# Patient Record
Sex: FEMALE | Race: WHITE | NOT HISPANIC OR LATINO | Employment: OTHER | URBAN - METROPOLITAN AREA
[De-identification: names, ages, dates, MRNs, and addresses within clinical notes are randomized per-mention and may not be internally consistent; named-entity substitution may affect disease eponyms.]

---

## 2017-01-01 DIAGNOSIS — E78.5 HYPERLIPIDEMIA: ICD-10-CM

## 2017-01-03 RX ORDER — PROPRANOLOL HYDROCHLORIDE 10 MG/1
TABLET ORAL
Qty: 90 TABLET | Refills: 2 | Status: SHIPPED | OUTPATIENT
Start: 2017-01-03 | End: 2017-10-18

## 2017-01-03 RX ORDER — SIMVASTATIN 20 MG/1
TABLET, FILM COATED ORAL
Qty: 90 TABLET | Refills: 2 | Status: SHIPPED | OUTPATIENT
Start: 2017-01-03 | End: 2017-10-05 | Stop reason: SDUPTHER

## 2017-05-03 RX ORDER — AMLODIPINE BESYLATE 5 MG/1
5 TABLET ORAL DAILY
Qty: 90 TABLET | Refills: 3 | Status: SHIPPED | OUTPATIENT
Start: 2017-05-03 | End: 2018-04-26 | Stop reason: SDUPTHER

## 2017-06-21 ENCOUNTER — OFFICE VISIT (OUTPATIENT)
Dept: INTERNAL MEDICINE | Facility: CLINIC | Age: 82
End: 2017-06-21
Payer: MEDICARE

## 2017-06-21 VITALS
HEIGHT: 60 IN | DIASTOLIC BLOOD PRESSURE: 71 MMHG | WEIGHT: 128.06 LBS | BODY MASS INDEX: 25.14 KG/M2 | OXYGEN SATURATION: 99 % | SYSTOLIC BLOOD PRESSURE: 153 MMHG | HEART RATE: 67 BPM | TEMPERATURE: 99 F | RESPIRATION RATE: 18 BRPM
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
## 2017-06-21 PROCEDURE — 1159F MED LIST DOCD IN RCRD: CPT | Mod: S$GLB,,, | Performed by: NURSE PRACTITIONER

## 2017-06-21 PROCEDURE — 99213 OFFICE O/P EST LOW 20 MIN: CPT | Mod: S$GLB,,, | Performed by: NURSE PRACTITIONER

## 2017-06-21 PROCEDURE — 99999 PR PBB SHADOW E&M-EST. PATIENT-LVL IV: CPT | Mod: PBBFAC,,, | Performed by: NURSE PRACTITIONER

## 2017-06-21 RX ORDER — SERTRALINE HYDROCHLORIDE 25 MG/1
25 TABLET, FILM COATED ORAL DAILY
COMMUNITY
End: 2017-12-13 | Stop reason: SDUPTHER

## 2017-06-21 NOTE — PROGRESS NOTES
Subjective:      Patient ID: Susy Mullen is a 82 y.o. female.    Chief Complaint: Animal Bite    HPI:  Patient states last Friday, 5 days ago, her 20 yr old house cat bit the back of her right leg.  She cleaned it up, it bled some.  She says she wants it evaluated, says it is looking better every day.  Denies any pain, fever, or drainage.  Has been cleaning it and using triple antibiotic cream.  She thinks she had a tetanus shot about 4 yrs ago from a different cat bit that was infected.      Past Medical History:   Diagnosis Date    AAA (abdominal aortic aneurysm)     seen by Dr. De Paz, CVT for evaluation, monitoring every 6 months    Anemia     Aortic valve insufficiency     followed by Dr. Urena, Cardiology; non-rheumatic    Carotid artery disease 12/30/2013    Cataract     Chronic kidney disease     Essential hypertension, benign 12/30/2013    GERD (gastroesophageal reflux disease)     Glaucoma     followed by Dr. Yip and Dr. Hdez    History of pneumonia 1968    bilateral    Hyperlipidemia     hypercholesterolemia    Osteoporosis     Pneumonia 1968    did not require hospitalization    Pure hypercholesterolemia 12/30/2013    PVD (peripheral vascular disease)     followed by Dr. Urena, Cardiology    Retinal tear     followed by Dr. Dickson       Past Surgical History:   Procedure Laterality Date    APPENDECTOMY  1978    incidental    arm surgery Left     for infected cat bite    CATARACT EXTRACTION Bilateral     then lens replacement bilateral     EYE SURGERY  2012    right eye glaucoma surgery    HYSTERECTOMY  1978    NEREYDA/BSO       Lab Results   Component Value Date    WBC 5.76 12/14/2016    HGB 12.8 12/14/2016    HCT 38.9 12/14/2016     12/14/2016    CHOL 186 12/14/2016    TRIG 93 12/14/2016    HDL 71 12/14/2016    ALT 10 12/14/2016    AST 20 12/14/2016     12/14/2016    K 4.5 12/14/2016     12/14/2016    CREATININE 0.8 12/14/2016    BUN 21  "12/14/2016    CO2 24 12/14/2016    TSH 2.656 12/14/2016    GLUF 105 11/22/2011    HGBA1C 5.8 11/22/2011       BP (!) 153/71   Pulse 67   Temp 98.8 °F (37.1 °C) (Tympanic)   Resp 18   Ht 4' 11.5" (1.511 m)   Wt 58.1 kg (128 lb 1.4 oz)   SpO2 99%   BMI 25.44 kg/m²       Review of Systems   Constitutional: Negative for appetite change, fatigue and unexpected weight change.   HENT: Negative for congestion, ear pain, postnasal drip, rhinorrhea, sinus pressure, sneezing, sore throat, tinnitus, trouble swallowing and voice change.    Eyes: Negative for pain, discharge, redness, itching and visual disturbance.   Respiratory: Negative for cough, chest tightness, shortness of breath and wheezing.    Cardiovascular: Negative for chest pain, palpitations and leg swelling.   Gastrointestinal: Negative for abdominal distention, abdominal pain, blood in stool, constipation, diarrhea, nausea and vomiting.        No reflux.   Genitourinary: Negative for difficulty urinating, dyspareunia, flank pain, menstrual problem and pelvic pain.   Musculoskeletal: Negative for arthralgias, back pain, myalgias and neck stiffness.   Skin: Negative for color change and rash.        Cat bite   Neurological: Negative for dizziness and headaches.   Psychiatric/Behavioral: Negative for confusion and sleep disturbance. The patient is not nervous/anxious.       Objective:     Physical Exam   Constitutional: She is oriented to person, place, and time. She appears well-developed and well-nourished. No distress.   Musculoskeletal:   Normal gait   Neurological: She is alert and oriented to person, place, and time.   Skin: Skin is warm and dry.   Right posterior lower leg in calf muscle is a small puncture site, there is no redness, no warmth, no swelling.  Just bruised around it a little.  No signs of infection, not near a bone   Psychiatric: She has a normal mood and affect. Her behavior is normal.     Assessment:      1. Cat bite, initial encounter "      Plan:   Cat bite, initial encounter      She really doesn't want antibiotics, we agreed to a watchful wait approach.  We discussed what were signs of infection and when to let me know of any changes.  She is going to check on her last tetanus shot, if > 5 yrs will RTC for a booster    Current Outpatient Prescriptions:     acetaZOLAMIDE (DIAMOX) 250 MG tablet, Take by mouth. 1 Tablet Oral Twice a day, Disp: , Rfl:     amlodipine (NORVASC) 5 MG tablet, Take 1 tablet (5 mg total) by mouth once daily., Disp: 90 tablet, Rfl: 3    ascorbic acid (VITAMIN C) 1000 MG tablet, Take 1,000 mg by mouth once daily., Disp: , Rfl:     aspirin (ECOTRIN) 81 MG EC tablet, Take by mouth. 1 Tablet, Delayed Release (E.C.) Oral Every day, Disp: , Rfl:     bimatoprost (LUMIGAN) 0.01 % Drop, 1 drop every evening., Disp: , Rfl:     CALCIUM CARBONATE/VITAMIN D3 (CALCIUM 500 + D ORAL), 0 tablets., Disp: , Rfl:     dexamethasone (DECADRON) 0.1 % ophthalmic solution, , Disp: , Rfl:     dorzolamide-timolol 2-0.5% (COSOPT) 2-0.5 % ophthalmic solution, , Disp: , Rfl:     omeprazole (PRILOSEC) 20 MG capsule, TAKE ONE CAPSULE BY MOUTH DAILY, Disp: 90 capsule, Rfl: 2    propranolol (INDERAL) 10 MG tablet, Take 1 tablet (10 mg total) by mouth once daily., Disp: 90 tablet, Rfl: 3    propranolol (INDERAL) 10 MG tablet, TAKE ONE TABLET BY MOUTH DAILY, Disp: 90 tablet, Rfl: 2    psyllium (METAMUCIL) packet, Take 1 packet by mouth once daily., Disp: , Rfl:     sertraline (ZOLOFT) 25 MG tablet, Take 25 mg by mouth once daily., Disp: , Rfl:     simvastatin (ZOCOR) 20 MG tablet, TAKE ONE TABLET BY MOUTH EVERY EVENING., Disp: 90 tablet, Rfl: 2    vitamin E 400 unit Tab, , Disp: , Rfl:

## 2017-06-26 ENCOUNTER — TELEPHONE (OUTPATIENT)
Dept: INTERNAL MEDICINE | Facility: CLINIC | Age: 82
End: 2017-06-26

## 2017-06-26 NOTE — TELEPHONE ENCOUNTER
----- Message from Nikky Rdz sent at 6/26/2017  4:17 PM CDT -----  Contact: pt  States she was bitten by her cat and she would like to get an antibiotic called in. Pt uses     [id number] - [place], [place] - [address] [facility]  [address] [address] [place] [facility] [address]  Phone: [phone] Fax: [phone]    Please call pt at [phone] or [phone]. Thank you

## 2017-06-26 NOTE — TELEPHONE ENCOUNTER
"S/w pt. Stated, " My cat bite is getting better but my daughter thinks I should have the Antibiotic. I also had my last tetanus at your office". I reviewed immunization record and most recent was TD 09/21/06. LV  06/21/17. Please advise. I apologized for the delay from our office/TGD  "

## 2017-06-27 ENCOUNTER — CLINICAL SUPPORT (OUTPATIENT)
Dept: INTERNAL MEDICINE | Facility: CLINIC | Age: 82
End: 2017-06-27
Payer: MEDICARE

## 2017-06-27 VITALS
TEMPERATURE: 99 F | WEIGHT: 128.5 LBS | BODY MASS INDEX: 25.91 KG/M2 | DIASTOLIC BLOOD PRESSURE: 63 MMHG | HEART RATE: 66 BPM | OXYGEN SATURATION: 97 % | HEIGHT: 59 IN | SYSTOLIC BLOOD PRESSURE: 142 MMHG

## 2017-06-27 DIAGNOSIS — Z23 NEED FOR VACCINATION: Primary | ICD-10-CM

## 2017-06-27 PROCEDURE — 90715 TDAP VACCINE 7 YRS/> IM: CPT | Mod: S$GLB,,, | Performed by: INTERNAL MEDICINE

## 2017-06-27 PROCEDURE — 99999 PR PBB SHADOW E&M-EST. PATIENT-LVL III: CPT | Mod: PBBFAC,,,

## 2017-06-27 PROCEDURE — 90471 IMMUNIZATION ADMIN: CPT | Mod: S$GLB,,, | Performed by: INTERNAL MEDICINE

## 2017-06-27 NOTE — TELEPHONE ENCOUNTER
Please let her know she is due to take the tetanus again.  She can come today, I can look at her leg again at the nurse visit.   Ill order the tetanus

## 2017-06-27 NOTE — PROGRESS NOTES
"Subjective:      Patient ID: Susy Mullen is a 82 y.o. female.    Chief Complaint: No chief complaint on file.    HPI:     Past Medical History:   Diagnosis Date    AAA (abdominal aortic aneurysm)     seen by Dr. De Paz, CVT for evaluation, monitoring every 6 months    Anemia     Aortic valve insufficiency     followed by Dr. Urena, Cardiology; non-rheumatic    Carotid artery disease 12/30/2013    Cataract     Chronic kidney disease     Essential hypertension, benign 12/30/2013    GERD (gastroesophageal reflux disease)     Glaucoma     followed by Dr. Yip and Dr. Hdez    History of pneumonia 1968    bilateral    Hyperlipidemia     hypercholesterolemia    Osteoporosis     Pneumonia 1968    did not require hospitalization    Pure hypercholesterolemia 12/30/2013    PVD (peripheral vascular disease)     followed by Dr. Urena, Cardiology    Retinal tear     followed by Dr. Dickson       Past Surgical History:   Procedure Laterality Date    APPENDECTOMY  1978    incidental    arm surgery Left     for infected cat bite    CATARACT EXTRACTION Bilateral     then lens replacement bilateral     EYE SURGERY  2012    right eye glaucoma surgery    HYSTERECTOMY  1978    NEREYDA/BSO       Lab Results   Component Value Date    WBC 5.76 12/14/2016    HGB 12.8 12/14/2016    HCT 38.9 12/14/2016     12/14/2016    CHOL 186 12/14/2016    TRIG 93 12/14/2016    HDL 71 12/14/2016    ALT 10 12/14/2016    AST 20 12/14/2016     12/14/2016    K 4.5 12/14/2016     12/14/2016    CREATININE 0.8 12/14/2016    BUN 21 12/14/2016    CO2 24 12/14/2016    TSH 2.656 12/14/2016    GLUF 105 11/22/2011    HGBA1C 5.8 11/22/2011       BP (!) 142/63   Pulse 66   Temp 98.9 °F (37.2 °C) (Tympanic)   Ht 4' 11" (1.499 m)   Wt 58.3 kg (128 lb 8.5 oz)   SpO2 97%   BMI 25.96 kg/m²       Review of Systems   Objective:     Physical Exam  Assessment:      1. Need for vaccination      Plan:   Need for " vaccination          Current Outpatient Prescriptions:     acetaZOLAMIDE (DIAMOX) 250 MG tablet, Take by mouth. 1 Tablet Oral Twice a day, Disp: , Rfl:     amlodipine (NORVASC) 5 MG tablet, Take 1 tablet (5 mg total) by mouth once daily., Disp: 90 tablet, Rfl: 3    ascorbic acid (VITAMIN C) 1000 MG tablet, Take 1,000 mg by mouth once daily., Disp: , Rfl:     aspirin (ECOTRIN) 81 MG EC tablet, Take by mouth. 1 Tablet, Delayed Release (E.C.) Oral Every day, Disp: , Rfl:     bimatoprost (LUMIGAN) 0.01 % Drop, 1 drop every evening., Disp: , Rfl:     CALCIUM CARBONATE/VITAMIN D3 (CALCIUM 500 + D ORAL), 0 tablets., Disp: , Rfl:     dexamethasone (DECADRON) 0.1 % ophthalmic solution, , Disp: , Rfl:     dorzolamide-timolol 2-0.5% (COSOPT) 2-0.5 % ophthalmic solution, , Disp: , Rfl:     omeprazole (PRILOSEC) 20 MG capsule, TAKE ONE CAPSULE BY MOUTH DAILY, Disp: 90 capsule, Rfl: 2    propranolol (INDERAL) 10 MG tablet, Take 1 tablet (10 mg total) by mouth once daily., Disp: 90 tablet, Rfl: 3    propranolol (INDERAL) 10 MG tablet, TAKE ONE TABLET BY MOUTH DAILY, Disp: 90 tablet, Rfl: 2    psyllium (METAMUCIL) packet, Take 1 packet by mouth once daily., Disp: , Rfl:     sertraline (ZOLOFT) 25 MG tablet, Take 25 mg by mouth once daily., Disp: , Rfl:     simvastatin (ZOCOR) 20 MG tablet, TAKE ONE TABLET BY MOUTH EVERY EVENING., Disp: 90 tablet, Rfl: 2    vitamin E 400 unit Tab, , Disp: , Rfl:

## 2017-06-28 NOTE — TELEPHONE ENCOUNTER
----- Message from Tamie Palafox sent at 6/28/2017 12:50 PM CDT -----  Contact: pt  Pt states she is retuning a missed call, pt can be reached at [phone]//thxMW

## 2017-07-05 ENCOUNTER — TELEPHONE (OUTPATIENT)
Dept: INTERNAL MEDICINE | Facility: CLINIC | Age: 82
End: 2017-07-05

## 2017-07-05 DIAGNOSIS — Z12.31 ENCOUNTER FOR SCREENING MAMMOGRAM FOR BREAST CANCER: Primary | ICD-10-CM

## 2017-07-05 NOTE — TELEPHONE ENCOUNTER
----- Message from Joycelyn Siddiqui sent at 7/5/2017  3:48 PM CDT -----  Contact: [phone] or [phone]  Patient would like an order put in for a mammogram. Please call back at [phone] or [phone].      Thanks,  Joycelyn Siddiqui

## 2017-07-05 NOTE — TELEPHONE ENCOUNTER
----- Message from Asmita Thao sent at 7/5/2017  9:02 AM CDT -----  Contact: patient  Calling to get mammogram order and to be seen on [place] and this Friday. Please call patient @ [phone] Thanks, Sandy

## 2017-07-31 ENCOUNTER — HOSPITAL ENCOUNTER (OUTPATIENT)
Dept: RADIOLOGY | Facility: HOSPITAL | Age: 82
Discharge: HOME | End: 2017-07-31
Attending: FAMILY MEDICINE
Payer: MEDICARE

## 2017-07-31 DIAGNOSIS — Z12.31 ENCOUNTER FOR SCREENING MAMMOGRAM FOR BREAST CANCER: ICD-10-CM

## 2017-07-31 PROCEDURE — 77063 BREAST TOMOSYNTHESIS BI: CPT | Mod: 26,,, | Performed by: RADIOLOGY

## 2017-07-31 PROCEDURE — 77067 SCR MAMMO BI INCL CAD: CPT | Mod: 26,,, | Performed by: RADIOLOGY

## 2017-07-31 PROCEDURE — 77067 SCR MAMMO BI INCL CAD: CPT | Mod: TC

## 2017-08-30 ENCOUNTER — PATIENT OUTREACH (OUTPATIENT)
Dept: ADMINISTRATIVE | Facility: HOSPITAL | Age: 82
End: 2017-08-30

## 2017-08-30 NOTE — PROGRESS NOTES
Schedule patient for blood pressure recheck, tetanus and zoster vaccine on 10/18/17. Patient in agreement and vocalize understanding. A appointment reminder will be sent in the mail.    Dexa Scan- last 01/26/15

## 2017-09-08 ENCOUNTER — PATIENT OUTREACH (OUTPATIENT)
Dept: ADMINISTRATIVE | Facility: HOSPITAL | Age: 82
End: 2017-09-08

## 2017-09-08 DIAGNOSIS — M81.0 OSTEOPOROSIS, UNSPECIFIED OSTEOPOROSIS TYPE, UNSPECIFIED PATHOLOGICAL FRACTURE PRESENCE: Primary | ICD-10-CM

## 2017-09-11 ENCOUNTER — PATIENT OUTREACH (OUTPATIENT)
Dept: ADMINISTRATIVE | Facility: HOSPITAL | Age: 82
End: 2017-09-11

## 2017-09-11 NOTE — PROGRESS NOTES
Schedule dexa scan on the same day as other appointments 10/02/17 at 09:30. Patient in agreement and vocalize understanding. Appointment reminder sent in the mail.

## 2017-09-15 DIAGNOSIS — K21.9 GASTROESOPHAGEAL REFLUX DISEASE, ESOPHAGITIS PRESENCE NOT SPECIFIED: ICD-10-CM

## 2017-09-15 RX ORDER — OMEPRAZOLE 20 MG/1
CAPSULE, DELAYED RELEASE ORAL
Qty: 90 CAPSULE | Refills: 1 | Status: SHIPPED | OUTPATIENT
Start: 2017-09-15 | End: 2017-10-18 | Stop reason: ALTCHOICE

## 2017-09-29 RX ORDER — PROPRANOLOL HYDROCHLORIDE 10 MG/1
TABLET ORAL
Qty: 90 TABLET | Refills: 1 | Status: SHIPPED | OUTPATIENT
Start: 2017-09-29 | End: 2017-10-18 | Stop reason: SDUPTHER

## 2017-10-05 DIAGNOSIS — E78.5 HYPERLIPIDEMIA: ICD-10-CM

## 2017-10-06 RX ORDER — SIMVASTATIN 20 MG/1
TABLET, FILM COATED ORAL
Qty: 90 TABLET | Refills: 0 | Status: SHIPPED | OUTPATIENT
Start: 2017-10-06 | End: 2018-01-03 | Stop reason: SDUPTHER

## 2017-10-16 PROBLEM — I70.0 ATHEROSCLEROSIS OF AORTA: Status: ACTIVE | Noted: 2017-10-16

## 2017-10-17 ENCOUNTER — TELEPHONE (OUTPATIENT)
Dept: RADIOLOGY | Facility: HOSPITAL | Age: 82
End: 2017-10-17

## 2017-10-18 ENCOUNTER — OFFICE VISIT (OUTPATIENT)
Dept: INTERNAL MEDICINE | Facility: CLINIC | Age: 82
End: 2017-10-18
Payer: MEDICARE

## 2017-10-18 ENCOUNTER — APPOINTMENT (OUTPATIENT)
Dept: RADIOLOGY | Facility: CLINIC | Age: 82
End: 2017-10-18
Attending: FAMILY MEDICINE
Payer: MEDICARE

## 2017-10-18 VITALS
SYSTOLIC BLOOD PRESSURE: 130 MMHG | BODY MASS INDEX: 25.34 KG/M2 | WEIGHT: 125.69 LBS | HEIGHT: 59 IN | OXYGEN SATURATION: 99 % | TEMPERATURE: 97 F | HEART RATE: 68 BPM | DIASTOLIC BLOOD PRESSURE: 70 MMHG

## 2017-10-18 VITALS
HEIGHT: 59 IN | SYSTOLIC BLOOD PRESSURE: 108 MMHG | DIASTOLIC BLOOD PRESSURE: 60 MMHG | BODY MASS INDEX: 25.25 KG/M2 | WEIGHT: 125.25 LBS | OXYGEN SATURATION: 98 % | HEART RATE: 60 BPM

## 2017-10-18 DIAGNOSIS — I71.40 ABDOMINAL AORTIC ANEURYSM (AAA) WITHOUT RUPTURE: Chronic | ICD-10-CM

## 2017-10-18 DIAGNOSIS — K21.9 GASTROESOPHAGEAL REFLUX DISEASE, ESOPHAGITIS PRESENCE NOT SPECIFIED: ICD-10-CM

## 2017-10-18 DIAGNOSIS — R73.01 ELEVATED FASTING GLUCOSE: ICD-10-CM

## 2017-10-18 DIAGNOSIS — M81.0 OSTEOPOROSIS, UNSPECIFIED OSTEOPOROSIS TYPE, UNSPECIFIED PATHOLOGICAL FRACTURE PRESENCE: ICD-10-CM

## 2017-10-18 DIAGNOSIS — I70.0 ATHEROSCLEROSIS OF AORTA: ICD-10-CM

## 2017-10-18 DIAGNOSIS — I77.9 CAROTID ARTERY DISEASE, UNSPECIFIED LATERALITY: ICD-10-CM

## 2017-10-18 DIAGNOSIS — H91.90 HEARING DIFFICULTY, UNSPECIFIED LATERALITY: ICD-10-CM

## 2017-10-18 DIAGNOSIS — I71.40 ABDOMINAL AORTIC ANEURYSM (AAA) WITHOUT RUPTURE: Primary | ICD-10-CM

## 2017-10-18 DIAGNOSIS — I10 ESSENTIAL HYPERTENSION: Chronic | ICD-10-CM

## 2017-10-18 DIAGNOSIS — Z00.00 ENCOUNTER FOR PREVENTIVE HEALTH EXAMINATION: Primary | ICD-10-CM

## 2017-10-18 DIAGNOSIS — H40.9 GLAUCOMA, UNSPECIFIED GLAUCOMA TYPE, UNSPECIFIED LATERALITY: Chronic | ICD-10-CM

## 2017-10-18 DIAGNOSIS — E78.5 HYPERLIPIDEMIA, UNSPECIFIED HYPERLIPIDEMIA TYPE: Chronic | ICD-10-CM

## 2017-10-18 DIAGNOSIS — H40.9 GLAUCOMA, UNSPECIFIED GLAUCOMA TYPE, UNSPECIFIED LATERALITY: ICD-10-CM

## 2017-10-18 DIAGNOSIS — E78.5 HYPERLIPIDEMIA, UNSPECIFIED HYPERLIPIDEMIA TYPE: ICD-10-CM

## 2017-10-18 DIAGNOSIS — I10 ESSENTIAL HYPERTENSION: ICD-10-CM

## 2017-10-18 PROCEDURE — 99999 PR PBB SHADOW E&M-EST. PATIENT-LVL IV: CPT | Mod: PBBFAC,,, | Performed by: FAMILY MEDICINE

## 2017-10-18 PROCEDURE — 99499 UNLISTED E&M SERVICE: CPT | Mod: S$GLB,,, | Performed by: NURSE PRACTITIONER

## 2017-10-18 PROCEDURE — 77080 DXA BONE DENSITY AXIAL: CPT | Mod: TC

## 2017-10-18 PROCEDURE — 99999 PR PBB SHADOW E&M-EST. PATIENT-LVL IV: CPT | Mod: PBBFAC,,, | Performed by: NURSE PRACTITIONER

## 2017-10-18 PROCEDURE — G0439 PPPS, SUBSEQ VISIT: HCPCS | Mod: S$GLB,,, | Performed by: NURSE PRACTITIONER

## 2017-10-18 PROCEDURE — 99214 OFFICE O/P EST MOD 30 MIN: CPT | Mod: S$GLB,,, | Performed by: FAMILY MEDICINE

## 2017-10-18 PROCEDURE — 77080 DXA BONE DENSITY AXIAL: CPT | Mod: 26,,, | Performed by: RADIOLOGY

## 2017-10-18 PROCEDURE — 99499 UNLISTED E&M SERVICE: CPT | Mod: S$GLB,,, | Performed by: FAMILY MEDICINE

## 2017-10-18 RX ORDER — PANTOPRAZOLE SODIUM 40 MG/1
40 TABLET, DELAYED RELEASE ORAL DAILY
Qty: 30 TABLET | Refills: 5 | Status: SHIPPED | OUTPATIENT
Start: 2017-10-18 | End: 2018-06-15 | Stop reason: SDUPTHER

## 2017-10-18 NOTE — Clinical Note
Your patient was seen today for a HRA visit. Abnormalities have been identified during this visit that may require additional testing and  follow up. I have included a copy of my visit note, please review the note and feel free to contact me with any questions.  Thank you for allowing me to participate in the care of your patients.  Ludmila Solis NP

## 2017-10-18 NOTE — PATIENT INSTRUCTIONS
Counseling and Referral of Other Preventative  (Italic type indicates deductible and co-insurance are waived)    Patient Name: Susy Mullen  Today's Date: 10/18/2017      SERVICE LIMITATIONS RECOMMENDATION    Vaccines    · Pneumococcal (once after 65)    · Influenza (annually)    · Hepatitis B (if medium/high risk)    · Prevnar 13      Hepatitis B medium/high risk factors:       - End-stage renal disease       - Hemophiliacs who received Factor VII or         IX concentrates       - Clients of institutions for the mentally             retarded       - Persons who live in the same house as          a HepB carrier       - Homosexual men       - Illicit injectable drug abusers     Pneumococcal: Done, 10/30/2001 according to health maintenance.      Influenza: Recommended; she is seeing PCP following HRA visit     Hepatitis B: N/A     Prevnar 13: Done, 12/14/2015    Mammogram (biennial age 50-74)  Annually (age 40 or over) Mammogram 7/31/2017, report recommended 1 year repeat.     Pap (up to age 70 and after 70 if unknown history or abnormal study last 10 years)   Denies a history of abnormal pap smear.   Per patient s/p total hysterectomy for benign reasons.     Colorectal cancer screening (to age 75)    · Fecal occult blood test (annual)  · Flexible sigmoidoscopy (5y)  · Screening colonoscopy (10y)  · Barium enema   Family history of colon cancer (brother).   Denies a history of colon polyps.   She is outside of recommended age for screening.   With family history, advised to discuss colon cancer screening with PCP.     Diabetes self-management training (no USPSTF recommendations)  Requires referral by treating physician for patient with diabetes or renal disease. 10 hours of initial DSMT sessions of no less than 30 minutes each in a continuous 12-month period. 2 hours of follow-up DSMT in subsequent years.  Patient is not a diabetic and does not have any known renal disease     Bone mass measurements (age 65 &  older, biennial)  Requires diagnosis related to osteoporosis or estrogen deficiency. Biennial benefit unless patient has history of long-term glucocorticoid  She had DEXA today. Advised to follow up with PCP for results and recommendations.     Glaucoma screening (no USPSTF recommendation)  Diabetes mellitus, family history   , age 50 or over    American, age 65 or over Encouraged yearly eye exams.    She reports she goes every 2-3 months.    Medical nutrition therapy for diabetes or renal disease (no recommended schedule)  Requires referral by treating physician for patient with diabetes or renal disease or kidney transplant within the past 3 years.  Can be provided in same year as diabetes self-management training (DSMT), and CMS recommends medical nutrition therapy take place after DSMT. Up to 3 hours for initial year and 2 hours in subsequent years. Patient is not a diabetic and does not have any known renal disease     Cardiovascular screening blood tests (every 5 years)  · Fasting lipid panel  Order as a panel if possible  Lipid 12/14/2016, repeat 1 year    Diabetes screening tests (at least every 3 years, Medicare covers annually or at 6-month intervals for prediabetic patients)  · Fasting blood sugar (FBS) or glucose tolerance test (GTT)  Patient must be diagnosed with one of the following:       - Hypertension       - Dyslipidemia       - Obesity (BMI 30kg/m2)       - Previous elevated impaired FBS or GTT       ... or any two of the following:       - Overweight (BMI 25 but <30)       - Family history of diabetes       - Age 65 or older       - History of gestational diabetes or birth of baby weighing more than 9 pounds CMP 12/14/2016 with elevated glucose. Advised to follow up with PCP.     HIV screening (annually for increased risk patients)  · HIV-1 and HIV-2 by EIA, or SASHA, rapid antibody test or oral mucosa transudate  Patients must be at increased risk for HIV infection per  USPSTF guidelines or pregnant. Tests covered annually for patient at increased risk or as requested by the patient. Pregnant patients may receive up to 3 tests during pregnancy.  Risks discussed, screening is not recommended    Smoking cessation counseling (up to 8 sessions per year)  Patients must be asymptomatic of tobacco-related conditions to receive as a preventative service.  Non-smoker    Subsequent annual wellness visit  At least 12 months since last AWV  Return in one year     The following information is provided to all patients.  This information is to help you find resources for any of the problems found today that may be affecting your health:                Living healthy guide: www.[facility].[place].[facility]      Understanding Diabetes: www.diabetes.org      Eating healthy: www.cdc.gov/healthyweight      CDC home safety checklist: www.cdc.gov/steadi/patient.html      Agency on Aging: www.goea.[place].[facility]      Alcoholics anonymous (AA): www.aa.org      Physical Activity: www.kiara.nih.gov/qt7nczt      Tobacco use: www.quitwithusla.org

## 2017-10-18 NOTE — PROGRESS NOTES
"Susy Mullen presented for a  Medicare AWV and comprehensive Health Risk Assessment today. The following components were reviewed and updated:    · Medical history  · Family History  · Social history  · Allergies and Current Medications  · Health Risk Assessment  · Health Maintenance  · Care Team     ** See Completed Assessments for Annual Wellness Visit within the encounter summary.**       The following assessments were completed:  · Living Situation  · CAGE  · Depression Screening  · Timed Get Up and Go  · Whisper Test  · Cognitive Function Screening  · Nutrition Screening  · ADL Screening  · PAQ Screening    Vitals:    10/18/17 0946   BP: 108/60   BP Location: Left arm   Patient Position: Sitting   BP Method: Medium (Manual)   Pulse: 60   SpO2: 98%   Weight: 56.8 kg (125 lb 3.5 oz)   Height: 4' 11" (1.499 m)     Body mass index is 25.29 kg/m².  Physical Exam   Constitutional: She is oriented to person, place, and time. She appears well-developed and well-nourished.   HENT:   Head: Normocephalic.   Cardiovascular: Normal rate, regular rhythm and normal heart sounds.    No murmur heard.  Pulmonary/Chest: Effort normal and breath sounds normal. No respiratory distress.   Abdominal: Soft. She exhibits no mass. There is no tenderness.   Musculoskeletal: Normal range of motion. She exhibits no edema.   Neurological: She is alert and oriented to person, place, and time. She exhibits normal muscle tone.   Skin: Skin is warm, dry and intact.   Psychiatric: She has a normal mood and affect. Her speech is normal and behavior is normal.   Nursing note and vitals reviewed.        Diagnoses and health risks identified today and associated recommendations/orders:    1. Encounter for preventive health examination    2. Abdominal aortic aneurysm (AAA) without rupture  US AA 1/11/2016---1.  Mild interval increase in the overall size of the known fusiform aneurysmal dilatation distal abdominal aorta.  See above.  Sees outside " vascular surgeon, Dr. De Paz    Reports monitored per Dr. De Paz every 6 months. Per patient last check AAA was stable. Reports she has an appointment with Dr. De Paz at the end of the month.   Stable. Continue current treatment plan as previously prescribed with your PCP and outside vascular surgeon, Dr. De Paz.     3. Carotid artery disease, unspecified laterality  US Carotid 8/31/2010 (in [place])  Per patient her outside cardiologist, Dr. Urena recommended to have her outside vascular surgeon recheck carotids at upcoming visit.   Advised to follow up with outside vascular surgeon for further evaluation and recommendations. She expressed understanding.      4. Essential hypertension  Stable and controlled. Continue current treatment plan as previously prescribed with your PCP.     5. Hyperlipidemia, unspecified hyperlipidemia type  Stable and controlled. Continue current treatment plan as previously prescribed with your PCP.     6. Elevated fasting glucose  Discussed with patient.   Advised to follow up with PCP for further evaluation and recommendations. She expressed understanding.      7. Atherosclerosis of aorta  CXR 3/7/2016 (care everywhere)---There is atherosclerotic calcification of the aorta  Discussed diagnosis and risk reduction with patient. She expressed understanding.     Advised to follow up with PCP and outside cardiologist for further recommendations. She expressed understanding.      8. Gastroesophageal reflux disease, esophagitis presence not specified  Not controlled on Prilosec. Reports she wakes up at night with a cough and can taste the acid in the back of her throat.   Patient counseled on dietary changes and tips to control acid.  She expressed understanding.     Advised to follow up with PCP for further evaluation and treatment. She expressed understanding.      9. Glaucoma, unspecified glaucoma type, unspecified laterality  Sees outside eye doctors, Dr. Yip and   [place]  Continue current treatment plan as previously prescribed with your outside eye doctors.      10. Hearing difficulty, unspecified laterality  Reports difficulty hearing and ringing in ears.   Abnormal whisper test-left ear abnormal: right ear normal.   Advised to follow up with PCP for further evaluation and recommendations. She expressed understanding.      11. Osteoporosis, unspecified osteoporosis type, unspecified pathological fracture presence  DEXA 1/26/2015  She had DEXA this morning.   Advised to follow up with PCP for results and recommendations. She expressed understanding.       Provided Susy with a 5-10 year written screening schedule and personal prevention plan. Recommendations were developed using the USPSTF age appropriate recommendations. Education, counseling, and referrals were provided as needed. After Visit Summary printed and given to patient which includes a list of additional screenings\tests needed.    Return in about 1 year (around 10/18/2018) for HRA.    Ludmila Solis NP

## 2017-10-18 NOTE — ASSESSMENT & PLAN NOTE
Not well controlled on prilosec, will change to protonix, patient will notify me if not improved/controlled

## 2017-10-18 NOTE — PROGRESS NOTES
Subjective:       Patient ID: Susy Mullen is a 82 y.o. female.    Chief Complaint: Follow-up (HTN, bp check.)    Patient presents to clinic today for followup of hypertension and review HRA.        Review of Systems   Constitutional: Negative for chills, fatigue, fever and unexpected weight change.   Eyes: Negative for visual disturbance.   Respiratory: Negative for shortness of breath.    Cardiovascular: Negative for chest pain.   Musculoskeletal: Negative for myalgias.   Neurological: Negative for headaches.       Objective:      Physical Exam   Constitutional: She is oriented to person, place, and time. She appears well-developed and well-nourished. No distress.   HENT:   Head: Normocephalic and atraumatic.   Eyes: Conjunctivae and EOM are normal. Pupils are equal, round, and reactive to light. No scleral icterus.   Cardiovascular: Normal rate and regular rhythm.  Exam reveals no gallop and no friction rub.    No murmur heard.  Pulmonary/Chest: Effort normal and breath sounds normal.   Neurological: She is alert and oriented to person, place, and time. No cranial nerve deficit. Gait normal.   Psychiatric: She has a normal mood and affect.   Vitals reviewed.      Assessment:       1. Abdominal aortic aneurysm (AAA) without rupture    2. Carotid artery disease, unspecified laterality    3. Essential hypertension    4. Hyperlipidemia, unspecified hyperlipidemia type    5. Elevated fasting glucose    6. Atherosclerosis of aorta    7. Gastroesophageal reflux disease, esophagitis presence not specified    8. Glaucoma, unspecified glaucoma type, unspecified laterality    9. Hearing difficulty, unspecified laterality    10. Osteoporosis, unspecified osteoporosis type, unspecified pathological fracture presence        Plan:     Problem List Items Addressed This Visit     Glaucoma (Chronic)    Overview     Sees outside eye doctors, Dr. Yip and Dr. Dcikson         Hyperlipidemia (Chronic)    Current Assessment &  Plan     Status pending labs, continue zocor           Essential hypertension (Chronic)    Current Assessment & Plan     Controlled, continue propranolol and amlodipine         Abdominal aortic aneurysm (AAA) without rupture - Primary (Chronic)    Overview     US AA 1/11/2016---1.  Mild interval increase in the overall size of the known fusiform aneurysmal dilatation distal abdominal aorta.  See above.  Sees outside vascular surgeon, Dr. De Paz         Osteoporosis    Current Assessment & Plan     dexa done this morning, result pending         GERD (gastroesophageal reflux disease)    Current Assessment & Plan     Not well controlled on prilosec, will change to protonix, patient will notify me if not improved/controlled         Relevant Medications    pantoprazole (PROTONIX) 40 MG tablet    Atherosclerosis of aorta    Overview     CXR 3/7/2016 (care everywhere)---There is atherosclerotic calcification of the aorta         Carotid artery disease    Overview     Followed by Dr. Urena, Cardiology         Elevated fasting glucose    Current Assessment & Plan     Rechecking lab, will obtain a1c if persistent         Hearing difficulty    Current Assessment & Plan     Chronic, stable, declines audiology referral           Other Visit Diagnoses    None.         Health Maintenance reviewed/updated. HRA reviewed.

## 2017-10-24 ENCOUNTER — LAB VISIT (OUTPATIENT)
Dept: LAB | Facility: HOSPITAL | Age: 82
End: 2017-10-24
Attending: FAMILY MEDICINE
Payer: MEDICARE

## 2017-10-24 DIAGNOSIS — E78.5 HYPERLIPIDEMIA, UNSPECIFIED HYPERLIPIDEMIA TYPE: ICD-10-CM

## 2017-10-24 PROCEDURE — 36415 COLL VENOUS BLD VENIPUNCTURE: CPT | Mod: PO

## 2017-10-24 PROCEDURE — 80053 COMPREHEN METABOLIC PANEL: CPT

## 2017-10-24 PROCEDURE — 80061 LIPID PANEL: CPT

## 2017-10-25 LAB
ALBUMIN SERPL BCP-MCNC: 3.5 G/DL
ALP SERPL-CCNC: 71 U/L
ALT SERPL W/O P-5'-P-CCNC: 17 U/L
ANION GAP SERPL CALC-SCNC: 7 MMOL/L
AST SERPL-CCNC: 25 U/L
BILIRUB SERPL-MCNC: 0.4 MG/DL
BUN SERPL-MCNC: 15 MG/DL
CALCIUM SERPL-MCNC: 9.4 MG/DL
CHLORIDE SERPL-SCNC: 105 MMOL/L
CHOLEST SERPL-MCNC: 186 MG/DL
CHOLEST/HDLC SERPL: 2.8 {RATIO}
CO2 SERPL-SCNC: 29 MMOL/L
CREAT SERPL-MCNC: 0.7 MG/DL
EST. GFR  (AFRICAN AMERICAN): >60 ML/MIN/1.73 M^2
EST. GFR  (NON AFRICAN AMERICAN): >60 ML/MIN/1.73 M^2
GLUCOSE SERPL-MCNC: 98 MG/DL
HDLC SERPL-MCNC: 67 MG/DL
HDLC SERPL: 36 %
LDLC SERPL CALC-MCNC: 99.4 MG/DL
NONHDLC SERPL-MCNC: 119 MG/DL
POTASSIUM SERPL-SCNC: 4.3 MMOL/L
PROT SERPL-MCNC: 7 G/DL
SODIUM SERPL-SCNC: 141 MMOL/L
TRIGL SERPL-MCNC: 98 MG/DL

## 2017-11-01 ENCOUNTER — TELEPHONE (OUTPATIENT)
Dept: INTERNAL MEDICINE | Facility: CLINIC | Age: 82
End: 2017-11-01

## 2017-11-01 NOTE — TELEPHONE ENCOUNTER
Please notify patient that her DEXA shows osteoporosis which appears relatively stable compared to previous. If she is interested in considering medication to treat this, she should schedule a visit to discuss. Also, if she is interested we can refer to PT for fall prevention program. Please let me know if she has any questions.

## 2017-11-01 NOTE — LETTER
November 8, 2017    Susy Mullen  [address] [id number]             [facility]  [address] [place] [id number]  Phone: [phone]  Fax: [phone] Dear Mrs. Mullen:    I was unable to reach you by phone. Please see the results from your recent DEXA (bone) scan below.      Your DEXA scan shows osteoporosis which appears relatively stable compared to previous. If you are interested in considering medication to treat this, please schedule a visit to discuss.   Also, if you are interested we can refer you to Physical Therapy for a fall prevention program.    If you have any questions or concerns, please don't hesitate to call.    Sincerely,        Geri Fry MD

## 2017-11-06 NOTE — TELEPHONE ENCOUNTER
Irais CASTLE Staff   Caller: Unspecified (Today,  8:13 AM)             Please call pt back at [phone] in regards to pt states your office call her on Friday past and she was returning the call.

## 2017-11-06 NOTE — TELEPHONE ENCOUNTER
I have attempted to contact this patient by phone with the following results: no answer, left message to return my call on answering machine, I will continue to try later.

## 2017-11-06 NOTE — TELEPHONE ENCOUNTER
----- Message from Irais Lawrence sent at 11/6/2017  8:13 AM CST -----  Please call pt back at [phone] in regards to pt states your office call her on Friday past and she was returning the call.

## 2017-11-08 ENCOUNTER — TELEPHONE (OUTPATIENT)
Dept: INTERNAL MEDICINE | Facility: CLINIC | Age: 82
End: 2017-11-08

## 2017-11-08 NOTE — TELEPHONE ENCOUNTER
----- Message from Tracy Palafox sent at 11/8/2017  8:02 AM CST -----  Patient returning Ericka call. Please adv/call [phone] or [phone].//thanks. cw

## 2017-11-08 NOTE — TELEPHONE ENCOUNTER
----- Message from Ivette Ardon sent at 11/7/2017  4:29 PM CST -----  returned call..[phone] (home)

## 2017-12-14 RX ORDER — SERTRALINE HYDROCHLORIDE 25 MG/1
TABLET, FILM COATED ORAL
Qty: 30 TABLET | Refills: 5 | Status: SHIPPED | OUTPATIENT
Start: 2017-12-14 | End: 2018-06-11 | Stop reason: SDUPTHER

## 2018-01-03 DIAGNOSIS — E78.5 HYPERLIPIDEMIA: ICD-10-CM

## 2018-01-03 RX ORDER — SIMVASTATIN 20 MG/1
TABLET, FILM COATED ORAL
Qty: 90 TABLET | Refills: 0 | Status: SHIPPED | OUTPATIENT
Start: 2018-01-03 | End: 2018-04-02 | Stop reason: SDUPTHER

## 2018-03-28 RX ORDER — PROPRANOLOL HYDROCHLORIDE 10 MG/1
TABLET ORAL
Qty: 90 TABLET | Refills: 1 | Status: SHIPPED | OUTPATIENT
Start: 2018-03-28 | End: 2018-09-26 | Stop reason: SDUPTHER

## 2018-04-02 DIAGNOSIS — E78.5 HYPERLIPIDEMIA, UNSPECIFIED HYPERLIPIDEMIA TYPE: ICD-10-CM

## 2018-04-02 NOTE — TELEPHONE ENCOUNTER
----- Message from Neema Hernandez sent at 4/2/2018  4:46 PM CDT -----  Contact: pt  She's calling stating that her Rx Fimvastatin 20 mg was supposed to be sent to [facility] on [address] but they havent received it yet, please advise [phone] (home)

## 2018-04-03 RX ORDER — SIMVASTATIN 20 MG/1
20 TABLET, FILM COATED ORAL NIGHTLY
Qty: 90 TABLET | Refills: 0 | Status: SHIPPED | OUTPATIENT
Start: 2018-04-03 | End: 2018-07-03 | Stop reason: SDUPTHER

## 2018-04-03 NOTE — TELEPHONE ENCOUNTER
----- Message from Lucia Whitney sent at 4/3/2018  8:07 AM CDT -----  Contact: Pt   Pt request call back states her script wasn't refill and needs nurse to check with Pharmacy. Pt states she is out and needs medication for today..[phone] (home)

## 2018-04-04 ENCOUNTER — PATIENT OUTREACH (OUTPATIENT)
Dept: ADMINISTRATIVE | Facility: HOSPITAL | Age: 83
End: 2018-04-04

## 2018-04-18 ENCOUNTER — OFFICE VISIT (OUTPATIENT)
Dept: INTERNAL MEDICINE | Facility: CLINIC | Age: 83
End: 2018-04-18
Payer: MEDICARE

## 2018-04-18 ENCOUNTER — HOSPITAL ENCOUNTER (OUTPATIENT)
Dept: RADIOLOGY | Facility: HOSPITAL | Age: 83
Discharge: HOME | End: 2018-04-18
Attending: FAMILY MEDICINE
Payer: MEDICARE

## 2018-04-18 VITALS
WEIGHT: 127.63 LBS | HEART RATE: 69 BPM | DIASTOLIC BLOOD PRESSURE: 80 MMHG | SYSTOLIC BLOOD PRESSURE: 124 MMHG | BODY MASS INDEX: 25.78 KG/M2 | TEMPERATURE: 97 F | OXYGEN SATURATION: 95 %

## 2018-04-18 DIAGNOSIS — M25.561 ACUTE PAIN OF RIGHT KNEE: ICD-10-CM

## 2018-04-18 DIAGNOSIS — H40.9 GLAUCOMA, UNSPECIFIED GLAUCOMA TYPE, UNSPECIFIED LATERALITY: Chronic | ICD-10-CM

## 2018-04-18 DIAGNOSIS — I70.0 ATHEROSCLEROSIS OF AORTA: ICD-10-CM

## 2018-04-18 DIAGNOSIS — I71.40 ABDOMINAL AORTIC ANEURYSM (AAA) WITHOUT RUPTURE: Chronic | ICD-10-CM

## 2018-04-18 DIAGNOSIS — M81.0 OSTEOPOROSIS, UNSPECIFIED OSTEOPOROSIS TYPE, UNSPECIFIED PATHOLOGICAL FRACTURE PRESENCE: ICD-10-CM

## 2018-04-18 DIAGNOSIS — I10 ESSENTIAL HYPERTENSION: Chronic | ICD-10-CM

## 2018-04-18 DIAGNOSIS — Z00.00 ROUTINE GENERAL MEDICAL EXAMINATION AT A HEALTH CARE FACILITY: Primary | ICD-10-CM

## 2018-04-18 DIAGNOSIS — E78.00 PURE HYPERCHOLESTEROLEMIA: ICD-10-CM

## 2018-04-18 DIAGNOSIS — K21.9 GASTROESOPHAGEAL REFLUX DISEASE, ESOPHAGITIS PRESENCE NOT SPECIFIED: ICD-10-CM

## 2018-04-18 DIAGNOSIS — I77.9 CAROTID ARTERY DISEASE, UNSPECIFIED LATERALITY: ICD-10-CM

## 2018-04-18 DIAGNOSIS — F41.9 ANXIETY: ICD-10-CM

## 2018-04-18 PROCEDURE — 73562 X-RAY EXAM OF KNEE 3: CPT | Mod: TC,LT

## 2018-04-18 PROCEDURE — 3079F DIAST BP 80-89 MM HG: CPT | Mod: CPTII,S$GLB,, | Performed by: FAMILY MEDICINE

## 2018-04-18 PROCEDURE — 99397 PER PM REEVAL EST PAT 65+ YR: CPT | Mod: S$GLB,,, | Performed by: FAMILY MEDICINE

## 2018-04-18 PROCEDURE — 99499 UNLISTED E&M SERVICE: CPT | Mod: S$PBB,,, | Performed by: FAMILY MEDICINE

## 2018-04-18 PROCEDURE — 73564 X-RAY EXAM KNEE 4 OR MORE: CPT | Mod: 26,RT,, | Performed by: RADIOLOGY

## 2018-04-18 PROCEDURE — 3074F SYST BP LT 130 MM HG: CPT | Mod: CPTII,S$GLB,, | Performed by: FAMILY MEDICINE

## 2018-04-18 PROCEDURE — 99999 PR PBB SHADOW E&M-EST. PATIENT-LVL III: CPT | Mod: PBBFAC,,, | Performed by: FAMILY MEDICINE

## 2018-04-18 PROCEDURE — 73562 X-RAY EXAM OF KNEE 3: CPT | Mod: 26,59,LT, | Performed by: RADIOLOGY

## 2018-04-18 RX ORDER — PREDNISOLONE ACETATE 10 MG/ML
SUSPENSION/ DROPS OPHTHALMIC
COMMUNITY
Start: 2018-04-03

## 2018-04-18 RX ORDER — POLYETHYLENE GLYCOL 3350 17 G/17G
POWDER, FOR SOLUTION ORAL
COMMUNITY
Start: 2018-01-26

## 2018-04-18 RX ORDER — KETOROLAC TROMETHAMINE 5 MG/ML
SOLUTION OPHTHALMIC
COMMUNITY
Start: 2018-03-28

## 2018-04-18 NOTE — PROGRESS NOTES
Subjective:       Patient ID: Susy Mullen is a 82 y.o. female.    Chief Complaint: Annual Exam and patient wants knee x-ray    Patient presents to clinic today for annual physical exam.      Review of Systems   Constitutional: Negative for chills, fatigue, fever and unexpected weight change.   HENT: Negative for congestion, dental problem, ear pain, hearing loss, rhinorrhea and trouble swallowing.    Eyes: Negative for pain and visual disturbance.   Respiratory: Negative for cough and shortness of breath.    Cardiovascular: Negative for chest pain, palpitations and leg swelling.   Gastrointestinal: Negative for abdominal distention, abdominal pain, blood in stool, constipation, diarrhea, nausea and vomiting.   Genitourinary: Negative for difficulty urinating and vaginal discharge.   Musculoskeletal: Positive for arthralgias (reports knee pain since climbing up and down stairs; reports right knee popping and feeling out of alignment at times; no falls). Negative for myalgias.   Skin: Negative for rash.   Neurological: Negative for dizziness, weakness, numbness and headaches.   Hematological: Negative for adenopathy. Does not bruise/bleed easily.   Psychiatric/Behavioral: Negative for dysphoric mood and sleep disturbance. The patient is not nervous/anxious.        Objective:      Physical Exam   Constitutional: She is oriented to person, place, and time. Vital signs are normal. She appears well-developed and well-nourished. No distress.   HENT:   Head: Normocephalic and atraumatic.   Right Ear: Tympanic membrane, external ear and ear canal normal.   Left Ear: Tympanic membrane, external ear and ear canal normal.   Nose: Nose normal. No mucosal edema or rhinorrhea.   Mouth/Throat: Uvula is midline, oropharynx is clear and moist and mucous membranes are normal.   Eyes: Conjunctivae, EOM and lids are normal. Pupils are equal, round, and reactive to light.   Neck: Normal range of motion. Neck supple. No thyromegaly  present.   Cardiovascular: Normal rate and regular rhythm.  Exam reveals no gallop and no friction rub.    No murmur heard.  Pulmonary/Chest: Effort normal and breath sounds normal. She has no wheezes. She has no rhonchi. She has no rales.   Abdominal: Soft. Normal appearance and bowel sounds are normal. She exhibits no distension and no mass. There is no hepatosplenomegaly. There is no tenderness.   Musculoskeletal: Normal range of motion.   Lymphadenopathy:     She has no cervical adenopathy.   Neurological: She is alert and oriented to person, place, and time. She has normal strength. No cranial nerve deficit or sensory deficit. Gait normal.   Reflex Scores:       Patellar reflexes are 2+ on the right side and 2+ on the left side.  Skin: Skin is warm and dry. No lesion and no rash noted. No cyanosis. Nails show no clubbing.   Psychiatric: She has a normal mood and affect.   Vitals reviewed.      Assessment:       1. Routine general medical examination at a health care facility    2. Acute pain of right knee    3. Essential hypertension    4. Pure hypercholesterolemia    5. Abdominal aortic aneurysm (AAA) without rupture    6. Atherosclerosis of aorta    7. Carotid artery disease, unspecified laterality    8. Gastroesophageal reflux disease, esophagitis presence not specified    9. Osteoporosis, unspecified osteoporosis type, unspecified pathological fracture presence    10. Glaucoma, unspecified glaucoma type, unspecified laterality    11. Anxiety        Plan:     Problem List Items Addressed This Visit     Glaucoma (Chronic)    Overview     Sees outside eye doctors, Dr. Yip and Dr. Dickson           Essential hypertension (Chronic)    Current Assessment & Plan     Controlled, continue current medications         Abdominal aortic aneurysm (AAA) without rupture (Chronic)    Overview     US AA 1/11/2016---1.  Mild interval increase in the overall size of the known fusiform aneurysmal dilatation distal abdominal  "aorta.  See above.  Sees outside vascular surgeon, Dr. De Paz  Aortic duplex 10/30/17 measured largest diameter at 3.3, "which is unchanged" from previous.         Current Assessment & Plan     Followed by Dr. De Paz, CVT         Osteoporosis    Current Assessment & Plan     DEXA up to date         GERD (gastroesophageal reflux disease)    Current Assessment & Plan     Stable on protonix         Pure hypercholesterolemia    Current Assessment & Plan     Status pending labs, continue simvastatin             Atherosclerosis of aorta    Overview     CXR 3/7/2016 (care everywhere)---There is atherosclerotic calcification of the aorta         Current Assessment & Plan     BP controlled, on simvastatin for HLD         Carotid artery disease    Overview     Followed by Dr. Urena, Cardiology  Carotid duplex done 10/30/17 showed bilateral 0-19%- "completely wide open" noes not need follow up at this time; continue ASA         Anxiety    Current Assessment & Plan     Stable on zoloft           Other Visit Diagnoses     Routine general medical examination at a health care facility    -  Primary    Acute pain of right knee        Relevant Orders    X-Ray Knee 3 View Right          Health Maintenance reviewed/updated.  Advised Ortho evaluation of right knee. She declines at this time due to copay, but will get imaging and consider seeing them if persistent. Also discussed that her knee pain is likely arthritis.  "

## 2018-04-20 ENCOUNTER — TELEPHONE (OUTPATIENT)
Dept: INTERNAL MEDICINE | Facility: CLINIC | Age: 83
End: 2018-04-20

## 2018-04-20 NOTE — TELEPHONE ENCOUNTER
----- Message from Geri Fry MD sent at 4/19/2018  4:06 PM CDT -----  Please notify patient that knee x-ray shows narrowing of the joint space, this is consistent with arthritis.

## 2018-04-24 ENCOUNTER — TELEPHONE (OUTPATIENT)
Dept: FAMILY MEDICINE | Facility: CLINIC | Age: 83
End: 2018-04-24

## 2018-04-24 NOTE — TELEPHONE ENCOUNTER
----- Message from Tamie Palafox sent at 4/24/2018  8:59 AM CDT -----  Contact: pt  The pt request a call concerning her test results, the pt can be reached at [phone] or [phone]///[email or web address]

## 2018-04-25 ENCOUNTER — LAB VISIT (OUTPATIENT)
Dept: LAB | Facility: HOSPITAL | Age: 83
End: 2018-04-25
Attending: FAMILY MEDICINE
Payer: MEDICARE

## 2018-04-25 DIAGNOSIS — E78.5 HYPERLIPIDEMIA, UNSPECIFIED HYPERLIPIDEMIA TYPE: ICD-10-CM

## 2018-04-25 DIAGNOSIS — I10 ESSENTIAL HYPERTENSION: ICD-10-CM

## 2018-04-25 LAB
BASOPHILS # BLD AUTO: 0.04 K/UL
BASOPHILS NFR BLD: 0.9 %
CHOLEST SERPL-MCNC: 187 MG/DL
CHOLEST/HDLC SERPL: 2.8 {RATIO}
DIFFERENTIAL METHOD: ABNORMAL
EOSINOPHIL # BLD AUTO: 0.2 K/UL
EOSINOPHIL NFR BLD: 3.3 %
ERYTHROCYTE [DISTWIDTH] IN BLOOD BY AUTOMATED COUNT: 12.7 %
HCT VFR BLD AUTO: 36.6 %
HDLC SERPL-MCNC: 66 MG/DL
HDLC SERPL: 35.3 %
HGB BLD-MCNC: 11.8 G/DL
IMM GRANULOCYTES # BLD AUTO: 0.01 K/UL
IMM GRANULOCYTES NFR BLD AUTO: 0.2 %
LDLC SERPL CALC-MCNC: 103.4 MG/DL
LYMPHOCYTES # BLD AUTO: 1.2 K/UL
LYMPHOCYTES NFR BLD: 26.9 %
MCH RBC QN AUTO: 32.2 PG
MCHC RBC AUTO-ENTMCNC: 32.2 G/DL
MCV RBC AUTO: 100 FL
MONOCYTES # BLD AUTO: 0.5 K/UL
MONOCYTES NFR BLD: 11.2 %
NEUTROPHILS # BLD AUTO: 2.6 K/UL
NEUTROPHILS NFR BLD: 57.5 %
NONHDLC SERPL-MCNC: 121 MG/DL
NRBC BLD-RTO: 0 /100 WBC
PLATELET # BLD AUTO: 251 K/UL
PMV BLD AUTO: 10.6 FL
RBC # BLD AUTO: 3.67 M/UL
TRIGL SERPL-MCNC: 88 MG/DL
TSH SERPL DL<=0.005 MIU/L-ACNC: 2.84 UIU/ML
WBC # BLD AUTO: 4.57 K/UL

## 2018-04-25 PROCEDURE — 80061 LIPID PANEL: CPT

## 2018-04-25 PROCEDURE — 36415 COLL VENOUS BLD VENIPUNCTURE: CPT | Mod: PO

## 2018-04-25 PROCEDURE — 84443 ASSAY THYROID STIM HORMONE: CPT

## 2018-04-25 PROCEDURE — 85025 COMPLETE CBC W/AUTO DIFF WBC: CPT

## 2018-04-26 RX ORDER — AMLODIPINE BESYLATE 5 MG/1
TABLET ORAL
Qty: 90 TABLET | Refills: 2 | Status: SHIPPED | OUTPATIENT
Start: 2018-04-26 | End: 2019-01-22 | Stop reason: SDUPTHER

## 2018-05-15 DIAGNOSIS — D64.9 ANEMIA, UNSPECIFIED TYPE: Primary | ICD-10-CM

## 2018-05-23 ENCOUNTER — TELEPHONE (OUTPATIENT)
Dept: INTERNAL MEDICINE | Facility: CLINIC | Age: 83
End: 2018-05-23

## 2018-05-23 NOTE — TELEPHONE ENCOUNTER
----- Message from Tracy Palafox sent at 5/23/2018  4:01 PM CDT -----  Patient returning the nurse call. Please adv/call [phone] .//thanks.cw

## 2018-06-05 ENCOUNTER — LAB VISIT (OUTPATIENT)
Dept: LAB | Facility: HOSPITAL | Age: 83
End: 2018-06-05
Attending: FAMILY MEDICINE
Payer: MEDICARE

## 2018-06-05 DIAGNOSIS — D64.9 ANEMIA, UNSPECIFIED TYPE: ICD-10-CM

## 2018-06-05 LAB
FERRITIN SERPL-MCNC: 109 NG/ML
IRON SERPL-MCNC: 133 UG/DL
SATURATED IRON: 35 %
TOTAL IRON BINDING CAPACITY: 382 UG/DL
TRANSFERRIN SERPL-MCNC: 258 MG/DL

## 2018-06-05 PROCEDURE — 83540 ASSAY OF IRON: CPT

## 2018-06-05 PROCEDURE — 36415 COLL VENOUS BLD VENIPUNCTURE: CPT | Mod: PO

## 2018-06-05 PROCEDURE — 82728 ASSAY OF FERRITIN: CPT

## 2018-06-08 ENCOUNTER — TELEPHONE (OUTPATIENT)
Dept: INTERNAL MEDICINE | Facility: CLINIC | Age: 83
End: 2018-06-08

## 2018-06-08 NOTE — TELEPHONE ENCOUNTER
----- Message from Lucio Garcia sent at 6/8/2018  3:20 PM CDT -----  Pt is requesting a call from nurse to review results.        Please call back at [phone]

## 2018-06-11 RX ORDER — SERTRALINE HYDROCHLORIDE 25 MG/1
TABLET, FILM COATED ORAL
Qty: 30 TABLET | Refills: 4 | Status: SHIPPED | OUTPATIENT
Start: 2018-06-11 | End: 2018-10-17 | Stop reason: SDUPTHER

## 2018-06-15 DIAGNOSIS — K21.9 GASTROESOPHAGEAL REFLUX DISEASE, ESOPHAGITIS PRESENCE NOT SPECIFIED: ICD-10-CM

## 2018-06-15 RX ORDER — PANTOPRAZOLE SODIUM 40 MG/1
TABLET, DELAYED RELEASE ORAL
Qty: 30 TABLET | Refills: 4 | Status: SHIPPED | OUTPATIENT
Start: 2018-06-15 | End: 2018-11-14 | Stop reason: SDUPTHER

## 2018-06-22 NOTE — PROGRESS NOTES
Clotrimazole 2x a day   Tdap administered.  See immunization record.  Pt advised to wait in clinic 15 minutes to monitor for side effects.  Pt voiced understanding and tolerated injection well.

## 2018-06-29 DIAGNOSIS — E78.5 HYPERLIPIDEMIA, UNSPECIFIED HYPERLIPIDEMIA TYPE: ICD-10-CM

## 2018-06-29 RX ORDER — SIMVASTATIN 20 MG/1
20 TABLET, FILM COATED ORAL NIGHTLY
Qty: 90 TABLET | Refills: 0 | OUTPATIENT
Start: 2018-06-29

## 2018-07-02 DIAGNOSIS — E78.5 HYPERLIPIDEMIA, UNSPECIFIED HYPERLIPIDEMIA TYPE: ICD-10-CM

## 2018-07-02 RX ORDER — SIMVASTATIN 20 MG/1
20 TABLET, FILM COATED ORAL NIGHTLY
Qty: 90 TABLET | Refills: 0 | OUTPATIENT
Start: 2018-07-02

## 2018-07-03 DIAGNOSIS — E78.5 HYPERLIPIDEMIA, UNSPECIFIED HYPERLIPIDEMIA TYPE: ICD-10-CM

## 2018-07-03 RX ORDER — SIMVASTATIN 20 MG/1
20 TABLET, FILM COATED ORAL NIGHTLY
Qty: 90 TABLET | Refills: 1 | Status: SHIPPED | OUTPATIENT
Start: 2018-07-03 | End: 2018-12-27

## 2018-07-03 RX ORDER — SIMVASTATIN 20 MG/1
20 TABLET, FILM COATED ORAL NIGHTLY
Qty: 90 TABLET | Refills: 0 | OUTPATIENT
Start: 2018-07-03

## 2018-08-23 ENCOUNTER — TELEPHONE (OUTPATIENT)
Dept: INTERNAL MEDICINE | Facility: CLINIC | Age: 83
End: 2018-08-23

## 2018-08-23 DIAGNOSIS — Z12.31 ENCOUNTER FOR SCREENING MAMMOGRAM FOR BREAST CANCER: Primary | ICD-10-CM

## 2018-08-23 NOTE — TELEPHONE ENCOUNTER
----- Message from Delia Hunter sent at 8/22/2018  3:16 PM CDT -----  Contact: Patient   Patient would like to come in for a mammogram but she needs orders in the system, Please call her at [phone].    Thanks  td

## 2018-08-24 NOTE — TELEPHONE ENCOUNTER
----- Message from Mattie Pearl MA sent at 8/23/2018  3:19 PM CDT -----  Contact: Pt  Spoke with patient and she stated that she would like an order sent in for her yearly mammo. I informed pt that I would let you know and notify her once appointment is setup. Patient verbalized understanding. Please advise.  ----- Message -----  From: Chris Slade  Sent: 8/23/2018   2:45 PM  To: Ang CASTLE Staff    Please give pt a call at ..[phone] (home) she is returning the nurse call.

## 2018-09-18 ENCOUNTER — HOSPITAL ENCOUNTER (OUTPATIENT)
Dept: RADIOLOGY | Facility: HOSPITAL | Age: 83
Discharge: HOME | End: 2018-09-18
Attending: FAMILY MEDICINE
Payer: MEDICARE

## 2018-09-18 VITALS — HEIGHT: 59 IN | BODY MASS INDEX: 25.6 KG/M2 | WEIGHT: 127 LBS

## 2018-09-18 DIAGNOSIS — Z12.31 ENCOUNTER FOR SCREENING MAMMOGRAM FOR BREAST CANCER: ICD-10-CM

## 2018-09-18 PROCEDURE — 77067 SCR MAMMO BI INCL CAD: CPT | Mod: 26,,, | Performed by: RADIOLOGY

## 2018-09-18 PROCEDURE — 77063 BREAST TOMOSYNTHESIS BI: CPT | Mod: TC

## 2018-09-18 PROCEDURE — 77063 BREAST TOMOSYNTHESIS BI: CPT | Mod: 26,,, | Performed by: RADIOLOGY

## 2018-09-19 ENCOUNTER — TELEPHONE (OUTPATIENT)
Dept: INTERNAL MEDICINE | Facility: CLINIC | Age: 83
End: 2018-09-19

## 2018-09-19 NOTE — TELEPHONE ENCOUNTER
----- Message from Becky Hunter NP sent at 9/19/2018 10:45 AM CDT -----  Please notify patient Mammogram appears normal. Routine follow up in one year is recommended.

## 2018-09-26 RX ORDER — PROPRANOLOL HYDROCHLORIDE 10 MG/1
TABLET ORAL
Qty: 90 TABLET | Refills: 1 | Status: SHIPPED | OUTPATIENT
Start: 2018-09-26 | End: 2019-04-04 | Stop reason: SDUPTHER

## 2018-10-17 ENCOUNTER — OFFICE VISIT (OUTPATIENT)
Dept: INTERNAL MEDICINE | Facility: CLINIC | Age: 83
End: 2018-10-17
Payer: MEDICARE

## 2018-10-17 VITALS
OXYGEN SATURATION: 98 % | HEIGHT: 59 IN | HEART RATE: 69 BPM | DIASTOLIC BLOOD PRESSURE: 68 MMHG | BODY MASS INDEX: 25.46 KG/M2 | SYSTOLIC BLOOD PRESSURE: 124 MMHG | WEIGHT: 126.31 LBS | TEMPERATURE: 98 F

## 2018-10-17 DIAGNOSIS — F41.9 ANXIETY: ICD-10-CM

## 2018-10-17 DIAGNOSIS — K21.9 GASTROESOPHAGEAL REFLUX DISEASE, ESOPHAGITIS PRESENCE NOT SPECIFIED: ICD-10-CM

## 2018-10-17 DIAGNOSIS — I10 ESSENTIAL HYPERTENSION: Primary | Chronic | ICD-10-CM

## 2018-10-17 DIAGNOSIS — E78.00 PURE HYPERCHOLESTEROLEMIA: ICD-10-CM

## 2018-10-17 PROCEDURE — 3074F SYST BP LT 130 MM HG: CPT | Mod: CPTII,,, | Performed by: NURSE PRACTITIONER

## 2018-10-17 PROCEDURE — 1101F PT FALLS ASSESS-DOCD LE1/YR: CPT | Mod: CPTII,,, | Performed by: NURSE PRACTITIONER

## 2018-10-17 PROCEDURE — 3078F DIAST BP <80 MM HG: CPT | Mod: CPTII,,, | Performed by: NURSE PRACTITIONER

## 2018-10-17 PROCEDURE — 99999 PR PBB SHADOW E&M-EST. PATIENT-LVL IV: CPT | Mod: PBBFAC,,, | Performed by: NURSE PRACTITIONER

## 2018-10-17 PROCEDURE — 99214 OFFICE O/P EST MOD 30 MIN: CPT | Mod: S$PBB,,, | Performed by: NURSE PRACTITIONER

## 2018-10-17 PROCEDURE — 99214 OFFICE O/P EST MOD 30 MIN: CPT | Mod: PBBFAC | Performed by: NURSE PRACTITIONER

## 2018-10-17 RX ORDER — SERTRALINE HYDROCHLORIDE 25 MG/1
25 TABLET, FILM COATED ORAL DAILY
Qty: 90 TABLET | Refills: 3 | Status: SHIPPED | OUTPATIENT
Start: 2018-10-17 | End: 2019-11-05 | Stop reason: SDUPTHER

## 2018-10-17 NOTE — PROGRESS NOTES
Susy Mullen  10/17/2018  [id number]    Geri Fry MD  Patient Care Team:  Geri Fry MD as PCP - General (Family Medicine)  Annelise Ortiz MD as PCP - Esteban PETERSEN/Sam Attributed  Austin Yip MD as Consulting Physician (Ophthalmology)  Jose De Paz Jr., MD as Consulting Physician (Vascular Surgery)  Darien Dickson MD (Ophthalmology)  Dylon Urena MD as Consulting Physician (Cardiology)  Evelyn Reaves LPN as Care Coordinator (Internal Medicine)  Has the patient seen any provider outside of the [facility] network since the last visit? (no). If yes, HIPPA forms completed and records requested.        Visit Type:a scheduled routine follow-up visit    Chief Complaint:  Chief Complaint   Patient presents with    6 month followup       History of Present Illness:    Patient presents to clinic today for follow up of chronic conditions including HTN, HLD, GERD, and anxiety. She is otherwise without concern.     History:  Past Medical History:   Diagnosis Date    AAA (abdominal aortic aneurysm)     seen by Dr. De Paz, CVT for evaluation, monitoring every 6 months    Anemia     Anxiety 4/18/2018    Aortic valve insufficiency     followed by Dr. Urena, Cardiology; non-rheumatic    Carotid artery disease 12/30/2013    Cataract     Chronic kidney disease     Essential hypertension, benign 12/30/2013    GERD (gastroesophageal reflux disease)     Glaucoma     followed by Dr. Yip and Dr. Hdez    History of pneumonia 1968    bilateral    Hyperlipidemia     hypercholesterolemia    Osteoporosis     Pneumonia 1968    did not require hospitalization    Pure hypercholesterolemia 12/30/2013    PVD (peripheral vascular disease)     followed by Dr. Urena, Cardiology (patient unaware of this diagnosis)    Retinal tear     Per patient not a tear but retinal edema; followed by Dr. Dickson     Past Surgical History:   Procedure Laterality Date    APPENDECTOMY  1978     incidental    arm surgery Left     for infected cat bite    CATARACT EXTRACTION Bilateral     then lens replacement bilateral     EYE SURGERY  2012    right eye glaucoma surgery    HYSTERECTOMY  1978    NEREYDA/BSO    OOPHORECTOMY       Family History   Problem Relation Age of Onset    Cancer Brother 60        lung    Mental retardation Sister     Diabetes Sister     Heart disease Brother     Kidney disease Brother     Diabetes Brother     Aneurysm Brother         aortic    Cancer Brother 73        colon     Aneurysm Father         abdominal    Kidney disease Father     Aneurysm Paternal Aunt         brain    Diabetes Paternal Aunt     Aneurysm Sister         brain    Stroke Brother     Diabetes Brother     Diabetes Paternal Uncle     Aneurysm Paternal Uncle      Social History     Socioeconomic History    Marital status:      Spouse name: Not on file    Number of children: 3    Years of education: Not on file    Highest education level: Not on file   Social Needs    Financial resource strain: Not on file    Food insecurity - worry: Not on file    Food insecurity - inability: Not on file    Transportation needs - medical: Not on file    Transportation needs - non-medical: Not on file   Occupational History    Not on file   Tobacco Use    Smoking status: Never Smoker    Smokeless tobacco: Never Used   Substance and Sexual Activity    Alcohol use: No    Drug use: No    Sexual activity: No     Partners: Male     Birth control/protection: See Surgical Hx   Other Topics Concern    Not on file   Social History Narrative    Lives alone. Apartment is attached to daughters house. Still does own cooking, driving finances. Caffeine intake -none. Uses caffeine cola occasionally. Does not have a living will or advanced directive. Walks 2 miles 5 days a week. Exercise class Monday and Wed. Tops on Friday     Patient Active Problem List   Diagnosis    Glaucoma    Osteoporosis    GERD  (gastroesophageal reflux disease)    Pure hypercholesterolemia    Essential hypertension    Abdominal aortic aneurysm (AAA) without rupture    Atherosclerosis of aorta    Carotid artery disease    Elevated fasting glucose    Hearing difficulty    Anxiety     Review of patient's allergies indicates:   Allergen Reactions    Codeine Nausea And Vomiting       The following were reviewed at this visit: active problem list, medication list, allergies, family history, social history, and health maintenance.    Medications:  Current Outpatient Medications on File Prior to Visit   Medication Sig Dispense Refill    acetaZOLAMIDE (DIAMOX) 250 MG tablet Take by mouth. 1 Tablet Oral Twice a day      amLODIPine (NORVASC) 5 MG tablet TAKE ONE TABLET BY MOUTH DAILY 90 tablet 2    ascorbic acid (VITAMIN C) 1000 MG tablet Take 1,000 mg by mouth once daily.      aspirin (ECOTRIN) 81 MG EC tablet Take by mouth. 1 Tablet, Delayed Release (E.C.) Oral Every day      bimatoprost (LUMIGAN) 0.01 % Drop 1 drop every evening.      dexamethasone (DECADRON) 0.1 % ophthalmic solution       dorzolamide-timolol 2-0.5% (COSOPT) 2-0.5 % ophthalmic solution       ketorolac 0.5% (ACULAR) 0.5 % Drop       pantoprazole (PROTONIX) 40 MG tablet TAKE ONE TABLET BY MOUTH DAILY 30 tablet 4    polyethylene glycol (GLYCOLAX) 17 gram/dose powder       prednisoLONE acetate (PRED FORTE) 1 % DrpS       propranolol (INDERAL) 10 MG tablet TAKE ONE TABLET BY MOUTH DAILY 90 tablet 1    simvastatin (ZOCOR) 20 MG tablet Take 1 tablet (20 mg total) by mouth every evening. 90 tablet 1    vitamin E 400 unit Tab       [DISCONTINUED] sertraline (ZOLOFT) 25 MG tablet TAKE 1 TABLET (25 MG TOTAL) BY MOUTH ONCE DAILY. 30 tablet 4    influenza (FLUZONE HIGH-DOSE) 180 mcg/0.5 mL vaccine Admin by [facility] 0.5 mL 0    [DISCONTINUED] POLYETHYLENE GLYCOL 3350 ORAL        No current facility-administered medications on file prior to visit.        Medications have  been reviewed and reconciled with patient at this visit.  Barriers to medications present (no)    Adverse reactions to current medications (no)    Over the counter medications reviewed (Yes ), and if needed added to active Medication list at this visit.     Exam:  Wt Readings from Last 3 Encounters:   10/17/18 57.3 kg (126 lb 5.2 oz)   09/18/18 57.6 kg (127 lb)   04/18/18 57.9 kg (127 lb 10.3 oz)     Temp Readings from Last 3 Encounters:   10/17/18 98.2 °F (36.8 °C) (Tympanic)   04/18/18 97.2 °F (36.2 °C) (Tympanic)   10/18/17 97.2 °F (36.2 °C) (Tympanic)     BP Readings from Last 3 Encounters:   10/17/18 124/68   04/18/18 124/80   10/18/17 130/70     Pulse Readings from Last 3 Encounters:   10/17/18 69   04/18/18 69   10/18/17 68     Body mass index is 25.51 kg/m².      Review of Systems   Constitutional: Negative for chills, fever and weight loss.   Respiratory: Negative for cough and shortness of breath.    Cardiovascular: Negative for chest pain, palpitations and leg swelling.   Musculoskeletal: Negative for myalgias.   Skin: Negative for rash.   Neurological: Negative for headaches.   Psychiatric/Behavioral: The patient is nervous/anxious (controlled with zoloft).      Physical Exam   Constitutional: She is oriented to person, place, and time. She appears well-developed and well-nourished. No distress.   HENT:   Head: Normocephalic and atraumatic.   Eyes: Conjunctivae are normal.   Cardiovascular: Normal rate and regular rhythm. Exam reveals no gallop and no friction rub.   No murmur heard.  Pulmonary/Chest: Effort normal and breath sounds normal.   Neurological: She is alert and oriented to person, place, and time.   Skin: Skin is warm and dry.   Psychiatric: She has a normal mood and affect.   Vitals reviewed.      Laboratory Reviewed ({Yes)  Lab Results   Component Value Date    WBC 4.57 04/25/2018    HGB 11.8 (L) 04/25/2018    HCT 36.6 (L) 04/25/2018     04/25/2018    CHOL 187 04/25/2018    TRIG 88  04/25/2018    HDL 66 04/25/2018    ALT 17 10/24/2017    AST 25 10/24/2017     10/24/2017    K 4.3 10/24/2017     10/24/2017    CREATININE 0.7 10/24/2017    BUN 15 10/24/2017    CO2 29 10/24/2017    TSH 2.837 04/25/2018    GLUF 105 11/22/2011    HGBA1C 5.8 11/22/2011       Susy was seen today for 6 month followup.    Diagnoses and all orders for this visit:    Essential hypertension  Comments:  controlled, continue current medications    Pure hypercholesterolemia  Comments:  status pending labs, continue statin    Gastroesophageal reflux disease, esophagitis presence not specified  Comments:  controlled, continue protonix    Anxiety  Comments:  controlled, continue zoloft  Orders:  -     sertraline (ZOLOFT) 25 MG tablet; Take 1 tablet (25 mg total) by mouth once daily.      Labs next week per patient request.            Care Plan/Goals: Reviewed  (N/A)  Goals     None          Follow up: Follow-up in about 6 months (around 4/17/2019), or if symptoms worsen or fail to improve, for annual wellness/EPP with Dr. Fry.    After visit summary was printed and given to patient upon discharge today.  Patient goals and care plan are included in After Visit Summary.

## 2018-10-18 ENCOUNTER — PES CALL (OUTPATIENT)
Dept: ADMINISTRATIVE | Facility: CLINIC | Age: 83
End: 2018-10-18

## 2018-10-24 ENCOUNTER — LAB VISIT (OUTPATIENT)
Dept: LAB | Facility: HOSPITAL | Age: 83
End: 2018-10-24
Attending: FAMILY MEDICINE
Payer: MEDICARE

## 2018-10-24 DIAGNOSIS — E78.5 HYPERLIPIDEMIA, UNSPECIFIED HYPERLIPIDEMIA TYPE: ICD-10-CM

## 2018-10-24 LAB
ALBUMIN SERPL BCP-MCNC: 3.8 G/DL
ALP SERPL-CCNC: 73 U/L
ALT SERPL W/O P-5'-P-CCNC: 10 U/L
ANION GAP SERPL CALC-SCNC: 7 MMOL/L
AST SERPL-CCNC: 19 U/L
BILIRUB SERPL-MCNC: 0.6 MG/DL
BUN SERPL-MCNC: 17 MG/DL
CALCIUM SERPL-MCNC: 9.9 MG/DL
CHLORIDE SERPL-SCNC: 105 MMOL/L
CHOLEST SERPL-MCNC: 199 MG/DL
CHOLEST/HDLC SERPL: 3.1 {RATIO}
CO2 SERPL-SCNC: 27 MMOL/L
CREAT SERPL-MCNC: 0.8 MG/DL
EST. GFR  (AFRICAN AMERICAN): >60 ML/MIN/1.73 M^2
EST. GFR  (NON AFRICAN AMERICAN): >60 ML/MIN/1.73 M^2
GLUCOSE SERPL-MCNC: 125 MG/DL
HDLC SERPL-MCNC: 65 MG/DL
HDLC SERPL: 32.7 %
LDLC SERPL CALC-MCNC: 108.4 MG/DL
NONHDLC SERPL-MCNC: 134 MG/DL
POTASSIUM SERPL-SCNC: 4.4 MMOL/L
PROT SERPL-MCNC: 7.4 G/DL
SODIUM SERPL-SCNC: 139 MMOL/L
TRIGL SERPL-MCNC: 128 MG/DL

## 2018-10-24 PROCEDURE — 80061 LIPID PANEL: CPT

## 2018-10-24 PROCEDURE — 36415 COLL VENOUS BLD VENIPUNCTURE: CPT | Mod: PO

## 2018-10-24 PROCEDURE — 80053 COMPREHEN METABOLIC PANEL: CPT

## 2018-10-26 ENCOUNTER — TELEPHONE (OUTPATIENT)
Dept: INTERNAL MEDICINE | Facility: CLINIC | Age: 83
End: 2018-10-26

## 2018-10-26 NOTE — TELEPHONE ENCOUNTER
Patient notified of results and copy of results mailed to the patient. Advised to call the office as needed, patient verbalized understanding.

## 2018-10-26 NOTE — TELEPHONE ENCOUNTER
----- Message from Becky Hunter NP sent at 10/25/2018  9:27 AM CDT -----  Please notify patient blood sugar a little high but CMP otherwise normal. Lipids acceptable.

## 2018-11-14 DIAGNOSIS — K21.9 GASTROESOPHAGEAL REFLUX DISEASE, ESOPHAGITIS PRESENCE NOT SPECIFIED: ICD-10-CM

## 2018-11-15 RX ORDER — PANTOPRAZOLE SODIUM 40 MG/1
TABLET, DELAYED RELEASE ORAL
Qty: 30 TABLET | Refills: 3 | Status: SHIPPED | OUTPATIENT
Start: 2018-11-15 | End: 2019-03-14 | Stop reason: SDUPTHER

## 2018-12-13 ENCOUNTER — TELEPHONE (OUTPATIENT)
Dept: INTERNAL MEDICINE | Facility: CLINIC | Age: 83
End: 2018-12-13

## 2018-12-13 NOTE — TELEPHONE ENCOUNTER
----- Message from Pham Brizuela sent at 12/13/2018  3:34 PM CST -----  Contact: Pt  Pt needs to reschedule nurse visit date requesting  01/15 @ 1pm ph:[phone] or [phone]

## 2018-12-27 RX ORDER — SIMVASTATIN 20 MG/1
TABLET, FILM COATED ORAL
Qty: 90 TABLET | Refills: 3 | Status: SHIPPED | OUTPATIENT
Start: 2018-12-27

## 2019-01-15 ENCOUNTER — OFFICE VISIT (OUTPATIENT)
Dept: INTERNAL MEDICINE | Facility: CLINIC | Age: 84
End: 2019-01-15
Payer: MEDICARE

## 2019-01-15 VITALS
HEART RATE: 71 BPM | SYSTOLIC BLOOD PRESSURE: 132 MMHG | WEIGHT: 127.63 LBS | BODY MASS INDEX: 25.73 KG/M2 | DIASTOLIC BLOOD PRESSURE: 68 MMHG | OXYGEN SATURATION: 97 % | HEIGHT: 59 IN

## 2019-01-15 DIAGNOSIS — M81.0 OSTEOPOROSIS, UNSPECIFIED OSTEOPOROSIS TYPE, UNSPECIFIED PATHOLOGICAL FRACTURE PRESENCE: ICD-10-CM

## 2019-01-15 DIAGNOSIS — I71.40 ABDOMINAL AORTIC ANEURYSM (AAA) WITHOUT RUPTURE: Chronic | ICD-10-CM

## 2019-01-15 DIAGNOSIS — I70.0 ATHEROSCLEROSIS OF AORTA: ICD-10-CM

## 2019-01-15 DIAGNOSIS — H40.9 GLAUCOMA, UNSPECIFIED GLAUCOMA TYPE, UNSPECIFIED LATERALITY: Chronic | ICD-10-CM

## 2019-01-15 DIAGNOSIS — K21.9 GASTROESOPHAGEAL REFLUX DISEASE, ESOPHAGITIS PRESENCE NOT SPECIFIED: ICD-10-CM

## 2019-01-15 DIAGNOSIS — H91.90 HEARING DIFFICULTY, UNSPECIFIED LATERALITY: ICD-10-CM

## 2019-01-15 DIAGNOSIS — F41.9 ANXIETY: ICD-10-CM

## 2019-01-15 DIAGNOSIS — D64.9 ANEMIA, UNSPECIFIED TYPE: ICD-10-CM

## 2019-01-15 DIAGNOSIS — E78.5 HYPERLIPIDEMIA, UNSPECIFIED HYPERLIPIDEMIA TYPE: ICD-10-CM

## 2019-01-15 DIAGNOSIS — R73.01 ELEVATED FASTING GLUCOSE: ICD-10-CM

## 2019-01-15 DIAGNOSIS — Z00.00 ENCOUNTER FOR PREVENTIVE HEALTH EXAMINATION: Primary | ICD-10-CM

## 2019-01-15 DIAGNOSIS — Z86.79 HISTORY OF CAROTID ARTERY DISEASE: ICD-10-CM

## 2019-01-15 DIAGNOSIS — I10 ESSENTIAL HYPERTENSION: Chronic | ICD-10-CM

## 2019-01-15 PROCEDURE — 99999 PR PBB SHADOW E&M-EST. PATIENT-LVL IV: CPT | Mod: PBBFAC,HCNC,, | Performed by: NURSE PRACTITIONER

## 2019-01-15 PROCEDURE — 99999 PR PBB SHADOW E&M-EST. PATIENT-LVL IV: ICD-10-PCS | Mod: PBBFAC,HCNC,, | Performed by: NURSE PRACTITIONER

## 2019-01-15 PROCEDURE — 3075F SYST BP GE 130 - 139MM HG: CPT | Mod: CPTII,HCNC,S$GLB, | Performed by: NURSE PRACTITIONER

## 2019-01-15 PROCEDURE — 3075F PR MOST RECENT SYSTOLIC BLOOD PRESS GE 130-139MM HG: ICD-10-PCS | Mod: CPTII,HCNC,S$GLB, | Performed by: NURSE PRACTITIONER

## 2019-01-15 PROCEDURE — G0439 PR MEDICARE ANNUAL WELLNESS SUBSEQUENT VISIT: ICD-10-PCS | Mod: HCNC,S$GLB,, | Performed by: NURSE PRACTITIONER

## 2019-01-15 PROCEDURE — 3078F DIAST BP <80 MM HG: CPT | Mod: CPTII,HCNC,S$GLB, | Performed by: NURSE PRACTITIONER

## 2019-01-15 PROCEDURE — 3078F PR MOST RECENT DIASTOLIC BLOOD PRESSURE < 80 MM HG: ICD-10-PCS | Mod: CPTII,HCNC,S$GLB, | Performed by: NURSE PRACTITIONER

## 2019-01-15 PROCEDURE — G0439 PPPS, SUBSEQ VISIT: HCPCS | Mod: HCNC,S$GLB,, | Performed by: NURSE PRACTITIONER

## 2019-01-15 NOTE — PROGRESS NOTES
"Susy Mullen presented for a  Medicare AWV and comprehensive Health Risk Assessment today. The following components were reviewed and updated:    · Medical history  · Family History  · Social history  · Allergies and Current Medications  · Health Risk Assessment  · Health Maintenance  · Care Team     ** See Completed Assessments for Annual Wellness Visit within the encounter summary.**       The following assessments were completed:  · Living Situation  · CAGE  · Depression Screening  · Timed Get Up and Go  · Whisper Test  · Cognitive Function Screening  · Nutrition Screening  · ADL Screening  · PAQ Screening    Vitals:    01/15/19 1001   BP: 132/68   Pulse: 71   SpO2: 97%   Weight: 57.9 kg (127 lb 10.3 oz)   Height: 4' 11" (1.499 m)     Body mass index is 25.78 kg/m².  Physical Exam   Constitutional: She is oriented to person, place, and time. She appears well-developed and well-nourished.   HENT:   Head: Normocephalic.   Cardiovascular: Normal rate, regular rhythm and normal heart sounds.   No murmur heard.  Pulmonary/Chest: Effort normal and breath sounds normal. No respiratory distress.   Abdominal: Soft. She exhibits no mass. There is no tenderness.   Musculoskeletal: Normal range of motion. She exhibits no edema.   Neurological: She is alert and oriented to person, place, and time. She exhibits normal muscle tone.   Skin: Skin is warm, dry and intact.   Psychiatric: She has a normal mood and affect. Her speech is normal and behavior is normal.   Nursing note and vitals reviewed.        Diagnoses and health risks identified today and associated recommendations/orders:    1. Encounter for preventive health examination    2. Abdominal aortic aneurysm (AAA) without rupture  US AA 1/11/2016---1.  Mild interval increase in the overall size of the known fusiform aneurysmal dilatation distal abdominal aorta.  See above.  Sees outside vascular surgeon, Dr. De Paz, every 6 months  Continue current treatment plan as " previously prescribed with your PCP and outside vascular surgeon.     3. Essential hypertension  Stable and controlled. Continue current treatment plan as previously prescribed with your PCP.     4. Hyperlipidemia, unspecified hyperlipidemia type  Stable and controlled. Continue current treatment plan as previously prescribed with your PCP.     5. Atherosclerosis of aorta  CXR 3/7/2016 (care everywhere)---There is atherosclerotic calcification of the aorta  Discussed diagnosis and risk reduction.   Stable and controlled. Continue current treatment plan as previously prescribed with your PCP.      6. History of carotid artery disease  See outside progress under [facility] dated 9/13/2018  Discussed s/s of MI and stroke (patient denies any s/s at this time) and advised to go to the ER if occur. She expressed understanding.   Continue current treatment plan as previously prescribed with your PCP and outside providers.     7. Elevated fasting glucose  125H, increased from prior check.   Continue current treatment plan as previously prescribed with your PCP.     8. Anxiety  Per patient stable and controlled on Zoloft.   Discussed the importance of not abruptly stopping medication to first consult with PCP. She expressed understanding.    Stable and controlled. Continue current treatment plan as previously prescribed with your PCP.     9. Anemia, unspecified type  Advised to follow up with PCP for monitoring. She expressed understanding.      10. Osteoporosis, unspecified osteoporosis type, unspecified pathological fracture presence  DEXA 10/18/2017  Continue current treatment plan as previously prescribed with your PCP.     11. Gastroesophageal reflux disease, esophagitis presence not specified  Per patient stable and controlled on Protonix.   Stable and controlled. Continue current treatment plan as previously prescribed with your PCP.     12. Glaucoma, unspecified glaucoma type, unspecified laterality  Sees outside eye  doctors, Dr. Yip and Dr. Dickson  Continue current treatment plan as previously prescribed with your outside eye doctors.     13. Hearing difficulty, unspecified laterality  Abnormal whisper test. Reports difficulty hearing.   Continue current treatment plan as previously prescribed with your PCP.       Provided Susy with a 5-10 year written screening schedule and personal prevention plan.  Education, counseling, and referrals were provided as needed. After Visit Summary printed and given to patient which includes a list of additional screenings\tests needed.    Follow-up in about 1 year (around 1/15/2020) for AWV.    Ludmila Solis NP

## 2019-01-15 NOTE — Clinical Note
Your patient was seen today for a HRA visit. Abnormalities (BOLDED) have been identified during this visit that may require additional testing and  follow up. I have included a copy of my visit note, please review the note and feel free to contact me with any questions. Thank you for allowing me to participate in the care of your patients. Ludmila Solis NP

## 2019-01-23 RX ORDER — AMLODIPINE BESYLATE 5 MG/1
TABLET ORAL
Qty: 90 TABLET | Refills: 1 | Status: SHIPPED | OUTPATIENT
Start: 2019-01-23 | End: 2019-07-27 | Stop reason: SDUPTHER

## 2019-03-14 DIAGNOSIS — K21.9 GASTROESOPHAGEAL REFLUX DISEASE, ESOPHAGITIS PRESENCE NOT SPECIFIED: ICD-10-CM

## 2019-03-15 RX ORDER — PANTOPRAZOLE SODIUM 40 MG/1
TABLET, DELAYED RELEASE ORAL
Qty: 30 TABLET | Refills: 2 | Status: SHIPPED | OUTPATIENT
Start: 2019-03-15 | End: 2019-06-20 | Stop reason: SDUPTHER

## 2019-04-04 RX ORDER — PROPRANOLOL HYDROCHLORIDE 10 MG/1
TABLET ORAL
Qty: 90 TABLET | Refills: 1 | Status: SHIPPED | OUTPATIENT
Start: 2019-04-04 | End: 2019-10-07 | Stop reason: SDUPTHER

## 2019-04-30 ENCOUNTER — LAB VISIT (OUTPATIENT)
Dept: LAB | Facility: HOSPITAL | Age: 84
End: 2019-04-30
Payer: MEDICARE

## 2019-04-30 ENCOUNTER — OFFICE VISIT (OUTPATIENT)
Dept: INTERNAL MEDICINE | Facility: CLINIC | Age: 84
End: 2019-04-30
Payer: MEDICARE

## 2019-04-30 VITALS
TEMPERATURE: 98 F | DIASTOLIC BLOOD PRESSURE: 64 MMHG | WEIGHT: 125.69 LBS | BODY MASS INDEX: 25.34 KG/M2 | SYSTOLIC BLOOD PRESSURE: 120 MMHG | OXYGEN SATURATION: 97 % | HEART RATE: 75 BPM | HEIGHT: 59 IN

## 2019-04-30 DIAGNOSIS — I70.0 ATHEROSCLEROSIS OF AORTA: ICD-10-CM

## 2019-04-30 DIAGNOSIS — D64.9 ANEMIA, UNSPECIFIED TYPE: ICD-10-CM

## 2019-04-30 DIAGNOSIS — H40.9 GLAUCOMA, UNSPECIFIED GLAUCOMA TYPE, UNSPECIFIED LATERALITY: ICD-10-CM

## 2019-04-30 DIAGNOSIS — Z86.79 HISTORY OF CAROTID ARTERY DISEASE: ICD-10-CM

## 2019-04-30 DIAGNOSIS — H91.90 HEARING DIFFICULTY, UNSPECIFIED LATERALITY: ICD-10-CM

## 2019-04-30 DIAGNOSIS — I10 ESSENTIAL HYPERTENSION: ICD-10-CM

## 2019-04-30 DIAGNOSIS — K21.9 GASTROESOPHAGEAL REFLUX DISEASE, ESOPHAGITIS PRESENCE NOT SPECIFIED: ICD-10-CM

## 2019-04-30 DIAGNOSIS — M81.0 OSTEOPOROSIS, UNSPECIFIED OSTEOPOROSIS TYPE, UNSPECIFIED PATHOLOGICAL FRACTURE PRESENCE: ICD-10-CM

## 2019-04-30 DIAGNOSIS — E78.5 HYPERLIPIDEMIA, UNSPECIFIED HYPERLIPIDEMIA TYPE: ICD-10-CM

## 2019-04-30 DIAGNOSIS — R73.01 ELEVATED FASTING GLUCOSE: ICD-10-CM

## 2019-04-30 DIAGNOSIS — Z00.00 ROUTINE GENERAL MEDICAL EXAMINATION AT A HEALTH CARE FACILITY: Primary | ICD-10-CM

## 2019-04-30 DIAGNOSIS — F41.9 ANXIETY: ICD-10-CM

## 2019-04-30 DIAGNOSIS — I71.40 ABDOMINAL AORTIC ANEURYSM (AAA) WITHOUT RUPTURE: ICD-10-CM

## 2019-04-30 DIAGNOSIS — E78.00 PURE HYPERCHOLESTEROLEMIA: ICD-10-CM

## 2019-04-30 LAB
ALBUMIN SERPL BCP-MCNC: 4.1 G/DL (ref 3.5–5.2)
ALP SERPL-CCNC: 72 U/L (ref 55–135)
ALT SERPL W/O P-5'-P-CCNC: 14 U/L (ref 10–44)
ANION GAP SERPL CALC-SCNC: 10 MMOL/L (ref 8–16)
AST SERPL-CCNC: 25 U/L (ref 10–40)
BASOPHILS # BLD AUTO: 0.03 K/UL (ref 0–0.2)
BASOPHILS NFR BLD: 0.7 % (ref 0–1.9)
BILIRUB SERPL-MCNC: 0.7 MG/DL (ref 0.1–1)
BUN SERPL-MCNC: 17 MG/DL (ref 8–23)
CALCIUM SERPL-MCNC: 10.2 MG/DL (ref 8.7–10.5)
CHLORIDE SERPL-SCNC: 104 MMOL/L (ref 95–110)
CHOLEST SERPL-MCNC: 195 MG/DL (ref 120–199)
CHOLEST/HDLC SERPL: 2.6 {RATIO} (ref 2–5)
CO2 SERPL-SCNC: 25 MMOL/L (ref 23–29)
CREAT SERPL-MCNC: 0.8 MG/DL (ref 0.5–1.4)
DIFFERENTIAL METHOD: ABNORMAL
EOSINOPHIL # BLD AUTO: 0.1 K/UL (ref 0–0.5)
EOSINOPHIL NFR BLD: 1.7 % (ref 0–8)
ERYTHROCYTE [DISTWIDTH] IN BLOOD BY AUTOMATED COUNT: 12.9 % (ref 11.5–14.5)
EST. GFR  (AFRICAN AMERICAN): >60 ML/MIN/1.73 M^2
EST. GFR  (NON AFRICAN AMERICAN): >60 ML/MIN/1.73 M^2
GLUCOSE SERPL-MCNC: 97 MG/DL (ref 70–110)
HCT VFR BLD AUTO: 38.3 % (ref 37–48.5)
HDLC SERPL-MCNC: 76 MG/DL (ref 40–75)
HDLC SERPL: 39 % (ref 20–50)
HGB BLD-MCNC: 12.3 G/DL (ref 12–16)
IMM GRANULOCYTES # BLD AUTO: 0 K/UL (ref 0–0.04)
IMM GRANULOCYTES NFR BLD AUTO: 0 % (ref 0–0.5)
LDLC SERPL CALC-MCNC: 105.6 MG/DL (ref 63–159)
LYMPHOCYTES # BLD AUTO: 1.2 K/UL (ref 1–4.8)
LYMPHOCYTES NFR BLD: 29.5 % (ref 18–48)
MCH RBC QN AUTO: 32.2 PG (ref 27–31)
MCHC RBC AUTO-ENTMCNC: 32.1 G/DL (ref 32–36)
MCV RBC AUTO: 100 FL (ref 82–98)
MONOCYTES # BLD AUTO: 0.5 K/UL (ref 0.3–1)
MONOCYTES NFR BLD: 11.4 % (ref 4–15)
NEUTROPHILS # BLD AUTO: 2.3 K/UL (ref 1.8–7.7)
NEUTROPHILS NFR BLD: 56.7 % (ref 38–73)
NONHDLC SERPL-MCNC: 119 MG/DL
NRBC BLD-RTO: 0 /100 WBC
PLATELET # BLD AUTO: 217 K/UL (ref 150–350)
PMV BLD AUTO: 10.8 FL (ref 9.2–12.9)
POTASSIUM SERPL-SCNC: 4.8 MMOL/L (ref 3.5–5.1)
PROT SERPL-MCNC: 7.6 G/DL (ref 6–8.4)
RBC # BLD AUTO: 3.82 M/UL (ref 4–5.4)
SODIUM SERPL-SCNC: 139 MMOL/L (ref 136–145)
TRIGL SERPL-MCNC: 67 MG/DL (ref 30–150)
TSH SERPL DL<=0.005 MIU/L-ACNC: 2.06 UIU/ML (ref 0.4–4)
WBC # BLD AUTO: 4.04 K/UL (ref 3.9–12.7)

## 2019-04-30 PROCEDURE — 3074F PR MOST RECENT SYSTOLIC BLOOD PRESSURE < 130 MM HG: ICD-10-PCS | Mod: HCNC,CPTII,S$GLB, | Performed by: FAMILY MEDICINE

## 2019-04-30 PROCEDURE — 3078F DIAST BP <80 MM HG: CPT | Mod: HCNC,CPTII,S$GLB, | Performed by: FAMILY MEDICINE

## 2019-04-30 PROCEDURE — 3078F PR MOST RECENT DIASTOLIC BLOOD PRESSURE < 80 MM HG: ICD-10-PCS | Mod: HCNC,CPTII,S$GLB, | Performed by: FAMILY MEDICINE

## 2019-04-30 PROCEDURE — 85025 COMPLETE CBC W/AUTO DIFF WBC: CPT | Mod: HCNC

## 2019-04-30 PROCEDURE — 80061 LIPID PANEL: CPT | Mod: HCNC

## 2019-04-30 PROCEDURE — 99999 PR PBB SHADOW E&M-EST. PATIENT-LVL IV: ICD-10-PCS | Mod: PBBFAC,HCNC,, | Performed by: FAMILY MEDICINE

## 2019-04-30 PROCEDURE — 3074F SYST BP LT 130 MM HG: CPT | Mod: HCNC,CPTII,S$GLB, | Performed by: FAMILY MEDICINE

## 2019-04-30 PROCEDURE — 99499 UNLISTED E&M SERVICE: CPT | Mod: HCNC,S$GLB,, | Performed by: FAMILY MEDICINE

## 2019-04-30 PROCEDURE — 99397 PER PM REEVAL EST PAT 65+ YR: CPT | Mod: HCNC,S$GLB,, | Performed by: FAMILY MEDICINE

## 2019-04-30 PROCEDURE — 99397 PR PREVENTIVE VISIT,EST,65 & OVER: ICD-10-PCS | Mod: HCNC,S$GLB,, | Performed by: FAMILY MEDICINE

## 2019-04-30 PROCEDURE — 84443 ASSAY THYROID STIM HORMONE: CPT | Mod: HCNC

## 2019-04-30 PROCEDURE — 99499 RISK ADDL DX/OHS AUDIT: ICD-10-PCS | Mod: HCNC,S$GLB,, | Performed by: FAMILY MEDICINE

## 2019-04-30 PROCEDURE — 80053 COMPREHEN METABOLIC PANEL: CPT | Mod: HCNC

## 2019-04-30 PROCEDURE — 99999 PR PBB SHADOW E&M-EST. PATIENT-LVL IV: CPT | Mod: PBBFAC,HCNC,, | Performed by: FAMILY MEDICINE

## 2019-04-30 PROCEDURE — 36415 COLL VENOUS BLD VENIPUNCTURE: CPT | Mod: HCNC

## 2019-04-30 NOTE — PROGRESS NOTES
Subjective:       Patient ID: Susy Mullen is a 84 y.o. female.    Chief Complaint: Annual Exam    Patient presents to clinic today for annual physical exam.    Review of Systems   Constitutional: Negative for activity change and unexpected weight change.   HENT: Negative for hearing loss, rhinorrhea and trouble swallowing.    Eyes: Negative for discharge and visual disturbance.   Respiratory: Negative for chest tightness and wheezing.    Cardiovascular: Negative for chest pain and palpitations.   Gastrointestinal: Negative for blood in stool, constipation, diarrhea and vomiting.   Endocrine: Negative for polydipsia and polyuria.   Genitourinary: Negative for difficulty urinating, dysuria, hematuria and menstrual problem.   Musculoskeletal: Positive for arthralgias. Negative for joint swelling and neck pain.   Neurological: Negative for weakness and headaches.   Psychiatric/Behavioral: Negative for confusion and dysphoric mood.       Objective:      Physical Exam   Constitutional: She is oriented to person, place, and time. Vital signs are normal. She appears well-developed and well-nourished. No distress.   HENT:   Head: Normocephalic and atraumatic.   Right Ear: Tympanic membrane, external ear and ear canal normal.   Left Ear: Tympanic membrane, external ear and ear canal normal.   Nose: Nose normal. No mucosal edema or rhinorrhea.   Mouth/Throat: Uvula is midline, oropharynx is clear and moist and mucous membranes are normal.   Eyes: Pupils are equal, round, and reactive to light. Conjunctivae, EOM and lids are normal.   Neck: Normal range of motion. Neck supple. No thyromegaly present.   Cardiovascular: Normal rate and regular rhythm. Exam reveals no gallop and no friction rub.   No murmur heard.  Pulmonary/Chest: Effort normal and breath sounds normal. She has no wheezes. She has no rhonchi. She has no rales.   Abdominal: Soft. Normal appearance and bowel sounds are normal. She exhibits no distension and  "no mass. There is no hepatosplenomegaly. There is no tenderness.   Musculoskeletal: Normal range of motion.   Lymphadenopathy:     She has no cervical adenopathy.   Neurological: She is alert and oriented to person, place, and time. She has normal strength. No cranial nerve deficit or sensory deficit. Gait normal.   Reflex Scores:       Patellar reflexes are 2+ on the right side and 2+ on the left side.  Skin: Skin is warm and dry. No lesion and no rash noted. No cyanosis. Nails show no clubbing.   Psychiatric: She has a normal mood and affect.   Vitals reviewed.      Assessment:       1. Routine general medical examination at a health care facility    2. Abdominal aortic aneurysm (AAA) without rupture    3. Essential hypertension    4. Pure hypercholesterolemia    5. Atherosclerosis of aorta    6. History of carotid artery disease    7. Elevated fasting glucose    8. Anxiety    9. Anemia, unspecified type    10. Osteoporosis, unspecified osteoporosis type, unspecified pathological fracture presence    11. Gastroesophageal reflux disease, esophagitis presence not specified    12. Glaucoma, unspecified glaucoma type, unspecified laterality    13. Hearing difficulty, unspecified laterality        Plan:     Problem List Items Addressed This Visit     Abdominal aortic aneurysm (AAA) without rupture (Chronic)    Overview     US AA 1/11/2016---1.  Mild interval increase in the overall size of the known fusiform aneurysmal dilatation distal abdominal aorta.  See above.  Sees outside vascular surgeon, Dr. De Paz  Aortic duplex 10/30/17 measured largest diameter at 3.3, "which is unchanged" from previous.         Anemia    Current Assessment & Plan     Status pending labs         Anxiety    Current Assessment & Plan     Stable on zoloft         Atherosclerosis of aorta    Overview     CXR 3/7/2016 (care everywhere)---There is atherosclerotic calcification of the aorta         Elevated fasting glucose    Current Assessment & " "Plan     Status pending labs         Essential hypertension (Chronic)    Current Assessment & Plan     Controlled, continue current meds         Gastroesophageal reflux disease    Current Assessment & Plan     Stable on protonix         Glaucoma (Chronic)    Overview     Sees outside eye doctors, Dr. Yip and Dr. Dickson           Hearing difficulty    Current Assessment & Plan     Declines audiology referral at this time         History of carotid artery disease    Overview     Followed by Dr. Urena, Cardiology  Carotid duplex done 10/30/17 showed bilateral 0-19%- "completely wide open" noes not need follow up at this time; continue ASA         Osteoporosis    Current Assessment & Plan     DEXA due in October, plan to order at 6 month f/u         Pure hypercholesterolemia    Current Assessment & Plan     Status pending labs, continue simvastatin             Other Visit Diagnoses     Routine general medical examination at a health care facility    -  Primary          Health Maintenance reviewed/updated.  "

## 2019-05-29 ENCOUNTER — TELEPHONE (OUTPATIENT)
Dept: INTERNAL MEDICINE | Facility: CLINIC | Age: 84
End: 2019-05-29

## 2019-05-29 ENCOUNTER — OFFICE VISIT (OUTPATIENT)
Dept: INTERNAL MEDICINE | Facility: CLINIC | Age: 84
End: 2019-05-29
Payer: MEDICARE

## 2019-05-29 VITALS
DIASTOLIC BLOOD PRESSURE: 68 MMHG | HEIGHT: 59 IN | HEART RATE: 76 BPM | WEIGHT: 125.44 LBS | OXYGEN SATURATION: 97 % | SYSTOLIC BLOOD PRESSURE: 136 MMHG | TEMPERATURE: 98 F | BODY MASS INDEX: 25.29 KG/M2

## 2019-05-29 DIAGNOSIS — R10.9 ABDOMINAL PAIN, UNSPECIFIED ABDOMINAL LOCATION: ICD-10-CM

## 2019-05-29 DIAGNOSIS — R82.90 ABNORMAL URINALYSIS: ICD-10-CM

## 2019-05-29 DIAGNOSIS — K21.9 GASTROESOPHAGEAL REFLUX DISEASE, ESOPHAGITIS PRESENCE NOT SPECIFIED: Primary | ICD-10-CM

## 2019-05-29 LAB
BACTERIA #/AREA URNS HPF: ABNORMAL /HPF
BILIRUB UR QL STRIP: NORMAL
CLARITY UR: CLEAR
COLOR UR: NORMAL
GLUCOSE UR QL STRIP: NORMAL
GRAN CASTS #/AREA URNS LPF: ABNORMAL /LPF
HGB UR QL STRIP: NORMAL
HYALINE CASTS #/AREA URNS LPF: 15 /LPF
KETONES UR QL STRIP: NORMAL
LEUKOCYTE ESTERASE UR QL STRIP: NORMAL
MICROSCOPIC COMMENT: ABNORMAL
NITRITE UR QL STRIP: NORMAL
NON-SQ EPI CELLS #/AREA URNS HPF: 5 /HPF
PH UR STRIP: NORMAL [PH] (ref 5–8)
PROT UR QL STRIP: NORMAL
RBC #/AREA URNS HPF: 1 /HPF (ref 0–4)
SP GR UR STRIP: NORMAL (ref 1–1.03)
SQUAMOUS #/AREA URNS HPF: 5 /HPF
URN SPEC COLLECT METH UR: NORMAL
WBC #/AREA URNS HPF: 17 /HPF (ref 0–5)

## 2019-05-29 PROCEDURE — 87086 URINE CULTURE/COLONY COUNT: CPT | Mod: HCNC

## 2019-05-29 PROCEDURE — 99214 PR OFFICE/OUTPT VISIT, EST, LEVL IV, 30-39 MIN: ICD-10-PCS | Mod: HCNC,S$GLB,, | Performed by: NURSE PRACTITIONER

## 2019-05-29 PROCEDURE — 99999 PR PBB SHADOW E&M-EST. PATIENT-LVL IV: ICD-10-PCS | Mod: PBBFAC,HCNC,, | Performed by: NURSE PRACTITIONER

## 2019-05-29 PROCEDURE — 3075F SYST BP GE 130 - 139MM HG: CPT | Mod: HCNC,CPTII,S$GLB, | Performed by: NURSE PRACTITIONER

## 2019-05-29 PROCEDURE — 3078F PR MOST RECENT DIASTOLIC BLOOD PRESSURE < 80 MM HG: ICD-10-PCS | Mod: HCNC,CPTII,S$GLB, | Performed by: NURSE PRACTITIONER

## 2019-05-29 PROCEDURE — 1101F PT FALLS ASSESS-DOCD LE1/YR: CPT | Mod: HCNC,CPTII,S$GLB, | Performed by: NURSE PRACTITIONER

## 2019-05-29 PROCEDURE — 99214 OFFICE O/P EST MOD 30 MIN: CPT | Mod: HCNC,S$GLB,, | Performed by: NURSE PRACTITIONER

## 2019-05-29 PROCEDURE — 3078F DIAST BP <80 MM HG: CPT | Mod: HCNC,CPTII,S$GLB, | Performed by: NURSE PRACTITIONER

## 2019-05-29 PROCEDURE — 1101F PR PT FALLS ASSESS DOC 0-1 FALLS W/OUT INJ PAST YR: ICD-10-PCS | Mod: HCNC,CPTII,S$GLB, | Performed by: NURSE PRACTITIONER

## 2019-05-29 PROCEDURE — 99999 PR PBB SHADOW E&M-EST. PATIENT-LVL IV: CPT | Mod: PBBFAC,HCNC,, | Performed by: NURSE PRACTITIONER

## 2019-05-29 PROCEDURE — 81000 URINALYSIS NONAUTO W/SCOPE: CPT | Mod: HCNC

## 2019-05-29 PROCEDURE — 3075F PR MOST RECENT SYSTOLIC BLOOD PRESS GE 130-139MM HG: ICD-10-PCS | Mod: HCNC,CPTII,S$GLB, | Performed by: NURSE PRACTITIONER

## 2019-05-29 RX ORDER — FAMOTIDINE 20 MG/1
20 TABLET, FILM COATED ORAL NIGHTLY
Qty: 30 TABLET | Refills: 0 | Status: SHIPPED | OUTPATIENT
Start: 2019-05-29 | End: 2019-07-02 | Stop reason: SDUPTHER

## 2019-05-29 NOTE — TELEPHONE ENCOUNTER
Patient states she was prescribed Zantac 75 mg daily at her appointment today,  but she was already taking the 150 mg and it did not work.  Patient is asking if something else can be prescribed.

## 2019-05-29 NOTE — TELEPHONE ENCOUNTER
----- Message from Becky Hunter NP sent at 5/29/2019 12:58 PM CDT -----  Please notify patient UA skewed due to color of urine. Will add culture to see if infection. Possible stone but unclear from urine.

## 2019-05-29 NOTE — PATIENT INSTRUCTIONS
Pantoprazole in the morning. Zantac at night.     Lifestyle Changes for Controlling GERD  When you have GERD, stomach acid feels as if its backing up toward your mouth. Whether or not you take medication to control your GERD, your symptoms can often be improved with lifestyle changes. Talk to your doctor about the following suggestions, which may help you get relief from your symptoms.  Raise Your Head    Reflux is more likely to strike when youre lying down flat, because stomach fluid can flow backward more easily. Raising the head of your bed 4-6 inches can help. To do this:  · Slide blocks or books under the legs at the head of your bed. Or, place a wedge under the mattress. Many foam stores can make a suitable wedge for you. The wedge should run from your waist to the top of your head.  · Dont just prop your head on several pillows. This increases pressure on your stomach. It can make GERD worse.  Watch Your Eating Habits  Certain foods may increase the acid in your stomach or relax the lower esophageal sphincter, making GERD more likely. Its best to avoid the following:  · Coffee, tea, and carbonated drinks (with and without caffeine)  · Fatty, fried, or spicy food  · Mint, chocolate, onions, and tomatoes  · Any other foods that seem to irritate your stomach or cause you pain  Relieve the Pressure  · Eat smaller meals, even if you have to eat more often.  · Dont lie down right after you eat. Wait a few hours for your stomach to empty.  · Avoid tight belts and tight-fitting clothes.  · Lose excess weight.  Tobacco and Alcohol  Avoid smoking tobacco and drinking alcohol. They can make GERD symptoms worse.  © 9760-4921 [employer], [address] [place] [address], [place] [address]. All rights reserved. This information is not intended as a substitute for professional medical care. Always follow your healthcare professional's instructions.

## 2019-05-29 NOTE — TELEPHONE ENCOUNTER
Patient notified of results. Advised she will be notified once the culture results are received and reviewed, patient verbalized understanding.

## 2019-05-29 NOTE — TELEPHONE ENCOUNTER
We can try pepcid at bedtime with protonix in the morning. If this does not help, she will need to see GI

## 2019-05-29 NOTE — TELEPHONE ENCOUNTER
----- Message from Mayela Shahid sent at 5/29/2019 12:33 PM CDT -----  Contact: pt  She's calling in regards to medication pls call  Pt back at [phone] (home)   Pt has a few concerns

## 2019-05-29 NOTE — PROGRESS NOTES
Subjective:       Patient ID: Susy Mullen is a 84 y.o. female.    Chief Complaint: Abdominal Pain; belching; and reflux issues    Patient presents for worsening GERD.    Takes miralax daily. Last BM this morning.     Gastroesophageal Reflux   She complains of abdominal pain, belching, choking (one episode two nights ago), coughing (nocturnal) and heartburn. She reports no nausea. This is a recurrent problem. The current episode started 1 to 4 weeks ago (2 weeks). The problem occurs frequently. The problem has been waxing and waning. The symptoms are aggravated by certain foods. She has tried a PPI for the symptoms. The treatment provided mild relief.     Review of Systems   Constitutional: Negative for chills and fever.   Respiratory: Positive for cough (nocturnal) and choking (one episode two nights ago). Negative for shortness of breath.    Gastrointestinal: Positive for abdominal pain, constipation (controlled with daily miralax) and heartburn. Negative for blood in stool, diarrhea, nausea and vomiting.   Genitourinary: Negative for decreased urine volume, dysuria, hematuria, urgency, vaginal discharge and vaginal pain.   Musculoskeletal: Positive for back pain.       Objective:      Physical Exam   Constitutional: She appears well-developed and well-nourished. No distress.   HENT:   Head: Normocephalic.   Eyes: Pupils are equal, round, and reactive to light. Conjunctivae and EOM are normal.   Cardiovascular: Normal rate and regular rhythm.   No murmur heard.  Pulmonary/Chest: Effort normal and breath sounds normal. No respiratory distress. She has no wheezes.   Abdominal: Bowel sounds are normal. She exhibits no distension and no mass. There is tenderness in the epigastric area. There is no rebound, no guarding and no CVA tenderness.   Psychiatric: She has a normal mood and affect.   Vitals reviewed.      Assessment:       1. Gastroesophageal reflux disease, esophagitis presence not specified    2.  Abdominal pain, unspecified abdominal location    3. Abnormal urinalysis        Plan:   Gastroesophageal reflux disease, esophagitis presence not specified  Comments:  not controlled, protonix in AM add zantac in PM and recheck in 2-4 weeks; bland diet discussed  Orders:  -     ranitidine (ZANTAC) 75 MG tablet; Take 1 tablet (75 mg total) by mouth nightly.  Dispense: 30 tablet; Refill: 0    Abdominal pain, unspecified abdominal location  -     Urinalysis    Abnormal urinalysis  -     Urine culture    Other orders  -     Urinalysis Microscopic      Patient concerned for kidney stone and requested UA. UA results skewed due to urine color. Will add culture.  Follow up in about 3 weeks (around 6/19/2019), or if symptoms worsen or fail to improve, for GERD .

## 2019-05-31 ENCOUNTER — TELEPHONE (OUTPATIENT)
Dept: INTERNAL MEDICINE | Facility: CLINIC | Age: 84
End: 2019-05-31

## 2019-05-31 LAB
BACTERIA UR CULT: NORMAL
BACTERIA UR CULT: NORMAL

## 2019-05-31 NOTE — TELEPHONE ENCOUNTER
----- Message from Ivette Vicente LPN sent at 5/31/2019  1:45 PM CDT -----  Spoke with pt voiced understanding of results.   Pt states she feels fine, states she does get a little back pain every now and then.

## 2019-06-06 ENCOUNTER — TELEPHONE (OUTPATIENT)
Dept: INTERNAL MEDICINE | Facility: CLINIC | Age: 84
End: 2019-06-06

## 2019-06-06 NOTE — TELEPHONE ENCOUNTER
----- Message from Mattie Pearl MA sent at 6/6/2019 12:44 PM CDT -----  Contact: Zeina( [facility])   Ms. Pena needs to be scheduled as well.  ----- Message -----  From: Ti Mei  Sent: 6/6/2019  11:12 AM  To: Ang [facility]ST[facility] Staff    Pt trying to get worked in for a hospital f/u for cancer diagnosis for the week of 6/17       ..[phone] (home)

## 2019-06-06 NOTE — TELEPHONE ENCOUNTER
Patient notified and offered to schedule appointment with , patient declined. Patient states she feels there is no need at this time to see  due to seeing many other doctors with her new cancer diagnosis. Advised to call the office as needed, patient verbalized understanding.

## 2019-06-06 NOTE — TELEPHONE ENCOUNTER
----- Message from Lucrecia Dudley sent at 6/6/2019 10:42 AM CDT -----  Contact: glenn  Type:  Sooner Apoointment Request    Caller is requesting a sooner appointment.  Caller declined first available appointment listed below.  Caller will not accept being placed on the waitlist and is requesting a message be sent to doctor.  Name of Caller:juan  When is the first available appointment?07/02  Symptoms:n/a  Would the patient rather a call back or a response via [facility]? Call back  Best Call Back Number:[phone]   Additional Information: pt needs to be seen within one week due to being diagnosed with cancer. Prefer morning on the 17, 18 or 19.    Thanks,  Lucrecia Dudley

## 2019-06-20 DIAGNOSIS — K21.9 GASTROESOPHAGEAL REFLUX DISEASE, ESOPHAGITIS PRESENCE NOT SPECIFIED: ICD-10-CM

## 2019-06-20 RX ORDER — PANTOPRAZOLE SODIUM 40 MG/1
TABLET, DELAYED RELEASE ORAL
Qty: 30 TABLET | Refills: 1 | Status: SHIPPED | OUTPATIENT
Start: 2019-06-20 | End: 2019-08-19 | Stop reason: SDUPTHER

## 2019-07-03 RX ORDER — FAMOTIDINE 20 MG/1
TABLET, FILM COATED ORAL
Qty: 30 TABLET | Refills: 0 | Status: SHIPPED | OUTPATIENT
Start: 2019-07-03 | End: 2019-07-31 | Stop reason: SDUPTHER

## 2019-07-31 RX ORDER — FAMOTIDINE 20 MG/1
TABLET, FILM COATED ORAL
Qty: 30 TABLET | Refills: 11 | Status: SHIPPED | OUTPATIENT
Start: 2019-07-31 | End: 2020-01-01

## 2019-08-07 RX ORDER — AMLODIPINE BESYLATE 5 MG/1
TABLET ORAL
Qty: 90 TABLET | Refills: 1 | Status: SHIPPED | OUTPATIENT
Start: 2019-08-07 | End: 2020-04-24

## 2019-08-19 DIAGNOSIS — K21.9 GASTROESOPHAGEAL REFLUX DISEASE, ESOPHAGITIS PRESENCE NOT SPECIFIED: ICD-10-CM

## 2019-08-19 RX ORDER — PANTOPRAZOLE SODIUM 40 MG/1
TABLET, DELAYED RELEASE ORAL
Qty: 30 TABLET | Refills: 11 | Status: SHIPPED | OUTPATIENT
Start: 2019-08-19 | End: 2020-08-21

## 2019-10-03 ENCOUNTER — IMMUNIZATION (OUTPATIENT)
Dept: INTERNAL MEDICINE | Facility: CLINIC | Age: 84
End: 2019-10-03
Payer: MEDICARE

## 2019-10-03 PROCEDURE — 99999 PR PBB SHADOW E&M-EST. PATIENT-LVL II: CPT | Mod: PBBFAC,HCNC,,

## 2019-10-03 PROCEDURE — 90662 IIV NO PRSV INCREASED AG IM: CPT | Mod: HCNC,S$GLB,, | Performed by: FAMILY MEDICINE

## 2019-10-03 PROCEDURE — G0008 FLU VACCINE - HIGH DOSE (65+) PRESERVATIVE FREE IM: ICD-10-PCS | Mod: HCNC,S$GLB,, | Performed by: FAMILY MEDICINE

## 2019-10-03 PROCEDURE — 99999 PR PBB SHADOW E&M-EST. PATIENT-LVL II: ICD-10-PCS | Mod: PBBFAC,HCNC,,

## 2019-10-03 PROCEDURE — 90662 FLU VACCINE - HIGH DOSE (65+) PRESERVATIVE FREE IM: ICD-10-PCS | Mod: HCNC,S$GLB,, | Performed by: FAMILY MEDICINE

## 2019-10-03 PROCEDURE — G0008 ADMIN INFLUENZA VIRUS VAC: HCPCS | Mod: HCNC,S$GLB,, | Performed by: FAMILY MEDICINE

## 2019-10-03 NOTE — PROGRESS NOTES
Administered high dose flu shot to left deltoid.  VIS given.  Immunization okayed with pt's oncologist per pt.  See immunization record.  Pt tolerated well.  Advised to wait at least 15 minutes to monitor for adverse reactions.  Pt verbalizes understanding.    ndc  01405-395-36  Lot  [id number]  Exp  4-4-2020

## 2019-10-07 RX ORDER — PROPRANOLOL HYDROCHLORIDE 10 MG/1
TABLET ORAL
Qty: 90 TABLET | Refills: 1 | Status: SHIPPED | OUTPATIENT
Start: 2019-10-07 | End: 2020-04-02

## 2019-10-23 ENCOUNTER — TELEPHONE (OUTPATIENT)
Dept: INTERNAL MEDICINE | Facility: CLINIC | Age: 84
End: 2019-10-23

## 2019-10-23 NOTE — TELEPHONE ENCOUNTER
----- Message from Lucrecia Dudley sent at 10/23/2019  1:53 PM CDT -----  Contact: self  Requesting call back regarding questions about lab work appt. Please call back at [phone].      Thanks,  Lucrecia Dudley

## 2019-10-23 NOTE — TELEPHONE ENCOUNTER
[place] on 10/30/2019.  Call placed to Dr. Sebas Shane office at [phone] at was told by the nurse that they have epic also and they can pull our orders at the time of her next visit with them and that the patient  just has to ask the tech to do so.  Called pt back to inform her that she just needs to ask the tech to pull Dr. Fry orders at her next visit with them  Pt verbalized understanding

## 2019-10-31 ENCOUNTER — TELEPHONE (OUTPATIENT)
Dept: INTERNAL MEDICINE | Facility: CLINIC | Age: 84
End: 2019-10-31

## 2019-10-31 NOTE — TELEPHONE ENCOUNTER
----- Message from Sugey Wilhelm sent at 10/31/2019  8:58 AM CDT -----  Contact: Pt  Pt asked that nurse contact her regarding an appointment. ([phone])

## 2019-11-05 DIAGNOSIS — F41.9 ANXIETY: ICD-10-CM

## 2019-11-05 RX ORDER — SERTRALINE HYDROCHLORIDE 25 MG/1
25 TABLET, FILM COATED ORAL DAILY
Qty: 90 TABLET | Refills: 1 | Status: SHIPPED | OUTPATIENT
Start: 2019-11-05 | End: 2020-05-04

## 2019-11-06 ENCOUNTER — OFFICE VISIT (OUTPATIENT)
Dept: INTERNAL MEDICINE | Facility: CLINIC | Age: 84
End: 2019-11-06
Payer: MEDICARE

## 2019-11-06 VITALS
WEIGHT: 123 LBS | DIASTOLIC BLOOD PRESSURE: 64 MMHG | BODY MASS INDEX: 24.8 KG/M2 | TEMPERATURE: 98 F | SYSTOLIC BLOOD PRESSURE: 130 MMHG | HEIGHT: 59 IN | HEART RATE: 84 BPM | OXYGEN SATURATION: 98 %

## 2019-11-06 DIAGNOSIS — D64.9 ANEMIA, UNSPECIFIED TYPE: ICD-10-CM

## 2019-11-06 DIAGNOSIS — I10 ESSENTIAL HYPERTENSION: Chronic | ICD-10-CM

## 2019-11-06 DIAGNOSIS — K21.9 GASTROESOPHAGEAL REFLUX DISEASE, ESOPHAGITIS PRESENCE NOT SPECIFIED: ICD-10-CM

## 2019-11-06 DIAGNOSIS — M81.0 OSTEOPOROSIS, UNSPECIFIED OSTEOPOROSIS TYPE, UNSPECIFIED PATHOLOGICAL FRACTURE PRESENCE: ICD-10-CM

## 2019-11-06 DIAGNOSIS — C25.9 PANCREATIC ADENOCARCINOMA: ICD-10-CM

## 2019-11-06 DIAGNOSIS — I70.0 ATHEROSCLEROSIS OF AORTA: ICD-10-CM

## 2019-11-06 DIAGNOSIS — E78.00 PURE HYPERCHOLESTEROLEMIA: ICD-10-CM

## 2019-11-06 DIAGNOSIS — Z09 FOLLOW UP: Primary | ICD-10-CM

## 2019-11-06 DIAGNOSIS — I71.40 ABDOMINAL AORTIC ANEURYSM (AAA) WITHOUT RUPTURE: Chronic | ICD-10-CM

## 2019-11-06 DIAGNOSIS — R73.01 ELEVATED FASTING GLUCOSE: ICD-10-CM

## 2019-11-06 PROBLEM — D64.81 ANTINEOPLASTIC CHEMOTHERAPY INDUCED ANEMIA: Status: ACTIVE | Noted: 2019-08-19

## 2019-11-06 PROBLEM — T45.1X5A LEUKOPENIA DUE TO ANTINEOPLASTIC CHEMOTHERAPY: Status: ACTIVE | Noted: 2019-10-21

## 2019-11-06 PROBLEM — T45.1X5A ANTINEOPLASTIC CHEMOTHERAPY INDUCED ANEMIA: Status: ACTIVE | Noted: 2019-08-19

## 2019-11-06 PROBLEM — D70.1 LEUKOPENIA DUE TO ANTINEOPLASTIC CHEMOTHERAPY: Status: ACTIVE | Noted: 2019-10-21

## 2019-11-06 PROCEDURE — 99499 UNLISTED E&M SERVICE: CPT | Mod: HCNC,S$GLB,, | Performed by: NURSE PRACTITIONER

## 2019-11-06 PROCEDURE — 1101F PT FALLS ASSESS-DOCD LE1/YR: CPT | Mod: HCNC,CPTII,S$GLB, | Performed by: NURSE PRACTITIONER

## 2019-11-06 PROCEDURE — 99213 PR OFFICE/OUTPT VISIT, EST, LEVL III, 20-29 MIN: ICD-10-PCS | Mod: HCNC,S$GLB,, | Performed by: NURSE PRACTITIONER

## 2019-11-06 PROCEDURE — 3075F SYST BP GE 130 - 139MM HG: CPT | Mod: HCNC,CPTII,S$GLB, | Performed by: NURSE PRACTITIONER

## 2019-11-06 PROCEDURE — 99999 PR PBB SHADOW E&M-EST. PATIENT-LVL IV: ICD-10-PCS | Mod: PBBFAC,HCNC,, | Performed by: NURSE PRACTITIONER

## 2019-11-06 PROCEDURE — 99499 UNLISTED E&M SERVICE: CPT | Mod: HCNC,S$GLB,, | Performed by: FAMILY MEDICINE

## 2019-11-06 PROCEDURE — 99213 OFFICE O/P EST LOW 20 MIN: CPT | Mod: HCNC,S$GLB,, | Performed by: NURSE PRACTITIONER

## 2019-11-06 PROCEDURE — 1101F PR PT FALLS ASSESS DOC 0-1 FALLS W/OUT INJ PAST YR: ICD-10-PCS | Mod: HCNC,CPTII,S$GLB, | Performed by: NURSE PRACTITIONER

## 2019-11-06 PROCEDURE — 3078F PR MOST RECENT DIASTOLIC BLOOD PRESSURE < 80 MM HG: ICD-10-PCS | Mod: HCNC,CPTII,S$GLB, | Performed by: NURSE PRACTITIONER

## 2019-11-06 PROCEDURE — 3078F DIAST BP <80 MM HG: CPT | Mod: HCNC,CPTII,S$GLB, | Performed by: NURSE PRACTITIONER

## 2019-11-06 PROCEDURE — 99499 RISK ADDL DX/OHS AUDIT: ICD-10-PCS | Mod: HCNC,S$GLB,, | Performed by: FAMILY MEDICINE

## 2019-11-06 PROCEDURE — 99999 PR PBB SHADOW E&M-EST. PATIENT-LVL IV: CPT | Mod: PBBFAC,HCNC,, | Performed by: NURSE PRACTITIONER

## 2019-11-06 PROCEDURE — 3075F PR MOST RECENT SYSTOLIC BLOOD PRESS GE 130-139MM HG: ICD-10-PCS | Mod: HCNC,CPTII,S$GLB, | Performed by: NURSE PRACTITIONER

## 2019-11-06 PROCEDURE — 99499 RISK ADDL DX/OHS AUDIT: ICD-10-PCS | Mod: HCNC,S$GLB,, | Performed by: NURSE PRACTITIONER

## 2019-11-06 NOTE — PROGRESS NOTES
Susy Mullen 84 y.o. female     Chief Complaint:  Chief Complaint   Patient presents with    6 month followup       History of Present Illness:  Pt is new to provider, but established in practice and presents for 6 mo f/u chronic conditions - no complaints at this time.     Undergoing chemo treatments for pancreatic adenocarcinoma, followed by Dr. Sebas Preston at [facility].     Treatments every Monday x 3 then off every 4th Monday.       Exam:  Review of Systems   Constitutional: Negative for chills and fever.   HENT: Positive for hearing loss.    Eyes: Negative for blurred vision.   Respiratory: Negative for cough, sputum production, shortness of breath and wheezing.    Cardiovascular: Negative for chest pain and leg swelling.   Gastrointestinal: Negative for abdominal pain, constipation, diarrhea, nausea and vomiting.   Genitourinary: Negative for dysuria and hematuria.   Musculoskeletal: Negative for myalgias.   Skin: Negative for rash.   Neurological: Negative for dizziness.     Physical Exam   Constitutional: She is oriented to person, place, and time. Vital signs are normal. She appears well-developed and well-nourished. She is cooperative.  Non-toxic appearance. She does not appear ill. No distress.   HENT:   Head: Normocephalic and atraumatic.   Right Ear: Tympanic membrane, external ear and ear canal normal. Decreased hearing is noted.   Left Ear: Tympanic membrane, external ear and ear canal normal. Decreased hearing is noted.   Nose: Nose normal.   Mouth/Throat: Uvula is midline, oropharynx is clear and moist and mucous membranes are normal. No oropharyngeal exudate.   Eyes: Pupils are equal, round, and reactive to light. Conjunctivae, EOM and lids are normal. Right eye exhibits no discharge. Left eye exhibits no discharge. No scleral icterus.   glasses   Neck: Normal range of motion. No tracheal deviation present.   Cardiovascular: Normal rate and regular rhythm.   Pulmonary/Chest: Effort normal and  breath sounds normal. No stridor. No respiratory distress. She has no wheezes. She has no rales. She exhibits no tenderness.   Abdominal: Soft. Bowel sounds are normal. She exhibits no distension. There is no tenderness. There is no guarding.   Musculoskeletal: Normal range of motion. She exhibits no edema.   Neurological: She is alert and oriented to person, place, and time.   Skin: Skin is warm, dry and intact. No rash noted. She is not diaphoretic. No erythema. No pallor.   Psychiatric: She has a normal mood and affect. Her speech is normal and behavior is normal. Judgment and thought content normal. Cognition and memory are normal.   Nursing note and vitals reviewed.      Most Recent Laboratory Results Reviewed ({Yes)  Lab Results   Component Value Date    WBC 4.04 04/30/2019    HGB 12.3 04/30/2019    HCT 38.3 04/30/2019     04/30/2019    CHOL 195 04/30/2019    TRIG 67 04/30/2019    HDL 76 (H) 04/30/2019    ALT 14 04/30/2019    AST 25 04/30/2019     04/30/2019    K 4.8 04/30/2019     04/30/2019    CREATININE 0.8 04/30/2019    BUN 17 04/30/2019    CO2 25 04/30/2019    TSH 2.060 04/30/2019    GLUF 105 11/22/2011    HGBA1C 5.8 11/22/2011       Assessment     ICD-10-CM ICD-9-CM   1. Follow up Z09 V67.9   2. Pancreatic adenocarcinoma C25.9 157.9   3. Essential hypertension I10 401.9   4. Anemia, unspecified type D64.9 285.9   5. Abdominal aortic aneurysm (AAA) without rupture I71.4 441.4   6. Atherosclerosis of aorta I70.0 440.0   7. Pure hypercholesterolemia E78.00 272.0   8. Elevated fasting glucose R73.01 790.21   9. Osteoporosis, unspecified osteoporosis type, unspecified pathological fracture presence M81.0 733.00   10. Gastroesophageal reflux disease, esophagitis presence not specified K21.9 530.81        Plan   Follow up  Defer shingles vaccine until after chemo    Pancreatic adenocarcinoma  - chemo every Monday x 3 then off every 4th Monday.  - followed by Dr. Sebas Preston at [facility].      Essential hypertension  - controlled on norvasc and inderal     Anemia, unspecified type  - followed by hem/onc, weekly labs     Abdominal aortic aneurysm (AAA) without rupture  - followed by cardiology   - on statin(zocor 20) and blood pressure controlled    Atherosclerosis of aorta  As above    Pure hypercholesterolemia  - lipids within normal limits in April  - will have lipids drawn on Monday before she goes to chemo  - continue Zocor    Elevated fasting glucose  -     Hemoglobin A1c; Future; Expected date: 11/11/2019    Osteoporosis, unspecified osteoporosis type, unspecified pathological fracture presence  - last DEXA scan September 2017  - Will postpone further studies until after finished chemo    Gastroesophageal reflux disease, esophagitis presence not specified  Controlled on Protonix and Pepcid         Follow up in about 6 months (around 5/6/2020) for PCP follow up, annual.  Future Appointments     Date Provider Specialty Appt Notes    11/11/2019  [facility]    5/7/2020 Geri Fry MD Internal Medicine annual

## 2020-01-01 RX ORDER — FAMOTIDINE 20 MG/1
TABLET, FILM COATED ORAL
Qty: 30 TABLET | Refills: 10 | Status: SHIPPED | OUTPATIENT
Start: 2020-01-01 | End: 2020-01-02 | Stop reason: ALTCHOICE

## 2020-01-02 RX ORDER — ZINC GLUCONATE 13.3 MG
200 LOZENGE ORAL 2 TIMES DAILY
Qty: 60 TABLET | Refills: 0 | Status: SHIPPED | OUTPATIENT
Start: 2020-01-02 | End: 2020-02-01

## 2020-01-02 NOTE — TELEPHONE ENCOUNTER
Spoke with pt states she can no afford to buy over the counter and asking if another generic can be called into the pharmacy.

## 2020-02-07 ENCOUNTER — PES CALL (OUTPATIENT)
Dept: ADMINISTRATIVE | Facility: CLINIC | Age: 85
End: 2020-02-07

## 2020-04-02 RX ORDER — PROPRANOLOL HYDROCHLORIDE 10 MG/1
TABLET ORAL
Qty: 90 TABLET | Refills: 0 | Status: SHIPPED | OUTPATIENT
Start: 2020-04-02 | End: 2020-07-01

## 2020-04-24 ENCOUNTER — TELEPHONE (OUTPATIENT)
Dept: INTERNAL MEDICINE | Facility: CLINIC | Age: 85
End: 2020-04-24

## 2020-04-24 RX ORDER — AMLODIPINE BESYLATE 5 MG/1
TABLET ORAL
Qty: 90 TABLET | Refills: 1 | Status: SHIPPED | OUTPATIENT
Start: 2020-04-24 | End: 2020-10-22 | Stop reason: SDUPTHER

## 2020-04-24 NOTE — TELEPHONE ENCOUNTER
----- Message from Nikky Rdz sent at 4/24/2020 10:19 AM CDT -----  Contact: pt  Type:  RX Refill Request    Who Called: pt  Refill or New Rx:refill  RX Name and Strength:amlodipine 5 mg  How is the patient currently taking it? (ex. 1XDay):1XDay  Is this a 30 day or 90 day RX:90  Preferred Pharmacy with phone number:.  [id number] - [place], [place] - [address] [facility]  [address] [facility] [address]  Phone: [phone] Fax: [phone]  Local or Mail Order:local  Ordering Provider:Dr Fry  Would the patient rather a call back or a response via [facility]? Call back  Best Call Back Number:[phone]  Additional Information: States [facility] sent a request, but they haven't heard anything.     Thank you

## 2020-04-24 NOTE — TELEPHONE ENCOUNTER
Called and spoke with patient to let her know that her amlodipine was sent to her pharmacy. Patient verbalized understanding.

## 2020-05-03 DIAGNOSIS — F41.9 ANXIETY: ICD-10-CM

## 2020-05-04 RX ORDER — SERTRALINE HYDROCHLORIDE 25 MG/1
25 TABLET, FILM COATED ORAL DAILY
Qty: 90 TABLET | Refills: 0 | Status: SHIPPED | OUTPATIENT
Start: 2020-05-04 | End: 2020-08-04

## 2020-07-01 RX ORDER — PROPRANOLOL HYDROCHLORIDE 10 MG/1
TABLET ORAL
Qty: 90 TABLET | Refills: 0 | Status: SHIPPED | OUTPATIENT
Start: 2020-07-01 | End: 2020-09-29

## 2020-08-04 ENCOUNTER — TELEPHONE (OUTPATIENT)
Dept: INTERNAL MEDICINE | Facility: CLINIC | Age: 85
End: 2020-08-04

## 2020-08-04 DIAGNOSIS — Z12.31 ENCOUNTER FOR SCREENING MAMMOGRAM FOR BREAST CANCER: Primary | ICD-10-CM

## 2020-08-04 NOTE — TELEPHONE ENCOUNTER
Patient requesting order for mammogram, last mammogram was 09/18/18, please advise if acceptable.

## 2020-10-21 DIAGNOSIS — I10 ESSENTIAL HYPERTENSION: Primary | Chronic | ICD-10-CM

## 2020-10-22 DIAGNOSIS — I10 ESSENTIAL HYPERTENSION: Chronic | ICD-10-CM

## 2020-10-22 RX ORDER — AMLODIPINE BESYLATE 5 MG/1
5 TABLET ORAL DAILY
Qty: 90 TABLET | Refills: 0 | Status: SHIPPED | OUTPATIENT
Start: 2020-10-22

## 2020-10-22 RX ORDER — AMLODIPINE BESYLATE 5 MG/1
5 TABLET ORAL DAILY
Qty: 90 TABLET | Refills: 0 | Status: SHIPPED | OUTPATIENT
Start: 2020-10-22 | End: 2020-10-22 | Stop reason: SDUPTHER

## 2020-10-22 RX ORDER — PROPRANOLOL HYDROCHLORIDE 10 MG/1
10 TABLET ORAL DAILY
Qty: 90 TABLET | Refills: 0 | Status: SHIPPED | OUTPATIENT
Start: 2020-10-22

## 2020-10-22 RX ORDER — PROPRANOLOL HYDROCHLORIDE 10 MG/1
TABLET ORAL
Qty: 90 TABLET | Refills: 0 | OUTPATIENT
Start: 2020-10-22

## 2020-10-22 NOTE — TELEPHONE ENCOUNTER
----- Message from Tom Mckinney sent at 10/22/2020  9:01 AM CDT -----  Contact: Pt  Type:  RX Refill Request    Who Called: pt   Refill or New Rx:refill   RX Name and Strength: amlodipine 5 mg   How is the patient currently taking it? (ex. 1XDay):once daily   Is this a 30 day or 90 day RX: 90 days   Preferred [facility] with phone number:[facility]   Local or Mail Order: local   Ordering Provider:Ang   Would the patient rather a call back or a response via [facility]? Phone   Best Call Back Number: [phone]  Additional Information:        [id number] - [facility] [place] - [address] [address] [facility]  [address] [address] [address] [place] [address] [address]  Phone: [phone] Fax: [phone]    O

## 2020-10-22 NOTE — TELEPHONE ENCOUNTER
I'm so sorry to hear that. Who is treating her for her cancer?    1 refill sent. Audio only visit is fine. Thank you.

## 2020-10-22 NOTE — TELEPHONE ENCOUNTER
----- Message from Desiree Monson sent at 10/22/2020  9:47 AM CDT -----  Type:  Needs Medical Advice    Who Called: Pt  Symptoms (please be specific):    How long has patient had these symptoms:    Pharmacy name and phone #:    Would the patient rather a call back or a response via [facility]? Call   Best Call Back Number: [phone]  Additional Information:   Pt is requesting a call back in regards to her B/P medication. Pt Is requesting a appt via phone if possible. Please call back.

## 2020-10-22 NOTE — TELEPHONE ENCOUNTER
Pt stated that she have pancreas cancer, her daughter in law who brings to the doctor is positive with the covid virus. Pt stated she will do an phone visit.

## 2020-10-30 DIAGNOSIS — F41.9 ANXIETY: ICD-10-CM

## 2020-10-30 RX ORDER — SERTRALINE HYDROCHLORIDE 25 MG/1
25 TABLET, FILM COATED ORAL DAILY
Qty: 90 TABLET | Refills: 0 | Status: SHIPPED | OUTPATIENT
Start: 2020-10-30

## 2020-11-23 DIAGNOSIS — K21.9 GASTROESOPHAGEAL REFLUX DISEASE: ICD-10-CM

## 2020-11-24 RX ORDER — PANTOPRAZOLE SODIUM 40 MG/1
TABLET, DELAYED RELEASE ORAL
Qty: 30 TABLET | Refills: 0 | Status: SHIPPED | OUTPATIENT
Start: 2020-11-24

## 2020-12-03 ENCOUNTER — TELEPHONE (OUTPATIENT)
Dept: INTERNAL MEDICINE | Facility: CLINIC | Age: 85
End: 2020-12-03

## 2020-12-03 NOTE — TELEPHONE ENCOUNTER
----- Message from Tom Mckinney sent at 12/3/2020 10:51 AM CST -----  Contact: Jennyfer from [facility]  Type:  Sooner Apoointment Request    Caller is requesting a sooner appointment.  Caller declined first available appointment listed below.  Caller will not accept being placed on the waitlist and is requesting a message be sent to doctor.  Name of Caller: jennyfer   When is the first available appointment?12/24  Symptoms:hospital follow up from [facility]   Would the patient rather a call back or a response via [facility]? Phone   Best Call Back Number:  [phone]  Additional Information: 1 week follow up from today

## 2020-12-16 ENCOUNTER — PES CALL (OUTPATIENT)
Dept: ADMINISTRATIVE | Facility: CLINIC | Age: 85
End: 2020-12-16